# Patient Record
Sex: MALE | Race: BLACK OR AFRICAN AMERICAN | NOT HISPANIC OR LATINO | Employment: UNEMPLOYED | URBAN - METROPOLITAN AREA
[De-identification: names, ages, dates, MRNs, and addresses within clinical notes are randomized per-mention and may not be internally consistent; named-entity substitution may affect disease eponyms.]

---

## 2019-08-11 ENCOUNTER — HOSPITAL ENCOUNTER (INPATIENT)
Facility: HOSPITAL | Age: 59
LOS: 7 days | Discharge: HOME | End: 2019-08-18
Attending: EMERGENCY MEDICINE | Admitting: HOSPITALIST

## 2019-08-11 ENCOUNTER — APPOINTMENT (OUTPATIENT)
Dept: CT IMAGING | Facility: HOSPITAL | Age: 59
End: 2019-08-11

## 2019-08-11 ENCOUNTER — APPOINTMENT (OUTPATIENT)
Dept: GENERAL RADIOLOGY | Facility: HOSPITAL | Age: 59
End: 2019-08-11

## 2019-08-11 DIAGNOSIS — D72.829 LEUKOCYTOSIS, UNSPECIFIED TYPE: ICD-10-CM

## 2019-08-11 DIAGNOSIS — R73.9 HYPERGLYCEMIA: ICD-10-CM

## 2019-08-11 DIAGNOSIS — R07.9 CHEST PAIN, UNSPECIFIED TYPE: Primary | ICD-10-CM

## 2019-08-11 DIAGNOSIS — E87.1 HYPONATREMIA: ICD-10-CM

## 2019-08-11 PROBLEM — N39.0 UTI (URINARY TRACT INFECTION): Status: ACTIVE | Noted: 2019-08-11

## 2019-08-11 PROBLEM — S06.9XAA TBI (TRAUMATIC BRAIN INJURY) (HCC): Status: ACTIVE | Noted: 2019-08-11

## 2019-08-11 PROBLEM — I10 HYPERTENSION: Status: ACTIVE | Noted: 2019-08-11

## 2019-08-11 PROBLEM — K57.92 DIVERTICULITIS: Status: ACTIVE | Noted: 2019-08-11

## 2019-08-11 LAB
ALBUMIN SERPL-MCNC: 4.7 G/DL (ref 3.5–5.2)
ALBUMIN/GLOB SERPL: 1.3 G/DL
ALP SERPL-CCNC: 76 U/L (ref 39–117)
ALT SERPL W P-5'-P-CCNC: 6 U/L (ref 1–41)
AMPHET+METHAMPHET UR QL: NEGATIVE
ANION GAP SERPL CALCULATED.3IONS-SCNC: 13.1 MMOL/L (ref 5–15)
AST SERPL-CCNC: 12 U/L (ref 1–40)
BACTERIA UR QL AUTO: ABNORMAL /HPF
BARBITURATES UR QL SCN: NEGATIVE
BASOPHILS # BLD AUTO: 0.04 10*3/MM3 (ref 0–0.2)
BASOPHILS NFR BLD AUTO: 0.2 % (ref 0–1.5)
BENZODIAZ UR QL SCN: NEGATIVE
BILIRUB SERPL-MCNC: 0.5 MG/DL (ref 0.2–1.2)
BILIRUB UR QL STRIP: NEGATIVE
BUN BLD-MCNC: 8 MG/DL (ref 6–20)
BUN/CREAT SERPL: 15.4 (ref 7–25)
CALCIUM SPEC-SCNC: 9.5 MG/DL (ref 8.6–10.5)
CANNABINOIDS SERPL QL: NEGATIVE
CHLORIDE SERPL-SCNC: 95 MMOL/L (ref 98–107)
CLARITY UR: ABNORMAL
CO2 SERPL-SCNC: 23.9 MMOL/L (ref 22–29)
COCAINE UR QL: NEGATIVE
COLOR UR: ABNORMAL
CREAT BLD-MCNC: 0.52 MG/DL (ref 0.76–1.27)
D DIMER PPP FEU-MCNC: 0.63 MCGFEU/ML (ref 0–0.49)
DEPRECATED RDW RBC AUTO: 48.6 FL (ref 37–54)
EOSINOPHIL # BLD AUTO: 0.01 10*3/MM3 (ref 0–0.4)
EOSINOPHIL NFR BLD AUTO: 0.1 % (ref 0.3–6.2)
ERYTHROCYTE [DISTWIDTH] IN BLOOD BY AUTOMATED COUNT: 13.1 % (ref 12.3–15.4)
GFR SERPL CREATININE-BSD FRML MDRD: >150 ML/MIN/1.73
GLOBULIN UR ELPH-MCNC: 3.5 GM/DL
GLUCOSE BLD-MCNC: 166 MG/DL (ref 65–99)
GLUCOSE BLDC GLUCOMTR-MCNC: 155 MG/DL (ref 70–130)
GLUCOSE UR STRIP-MCNC: ABNORMAL MG/DL
HCT VFR BLD AUTO: 40.5 % (ref 37.5–51)
HGB BLD-MCNC: 14.2 G/DL (ref 13–17.7)
HGB UR QL STRIP.AUTO: ABNORMAL
HOLD SPECIMEN: NORMAL
HOLD SPECIMEN: NORMAL
HYALINE CASTS UR QL AUTO: ABNORMAL /LPF
IMM GRANULOCYTES # BLD AUTO: 0.1 10*3/MM3 (ref 0–0.05)
IMM GRANULOCYTES NFR BLD AUTO: 0.5 % (ref 0–0.5)
INR PPP: 1.1 (ref 0.9–1.1)
KETONES UR QL STRIP: ABNORMAL
LEUKOCYTE ESTERASE UR QL STRIP.AUTO: ABNORMAL
LIPASE SERPL-CCNC: 13 U/L (ref 13–60)
LYMPHOCYTES # BLD AUTO: 1.03 10*3/MM3 (ref 0.7–3.1)
LYMPHOCYTES NFR BLD AUTO: 5.5 % (ref 19.6–45.3)
MAGNESIUM SERPL-MCNC: 1.7 MG/DL (ref 1.6–2.6)
MCH RBC QN AUTO: 35.3 PG (ref 26.6–33)
MCHC RBC AUTO-ENTMCNC: 35.1 G/DL (ref 31.5–35.7)
MCV RBC AUTO: 100.7 FL (ref 79–97)
METHADONE UR QL SCN: NEGATIVE
MONOCYTES # BLD AUTO: 2.38 10*3/MM3 (ref 0.1–0.9)
MONOCYTES NFR BLD AUTO: 12.6 % (ref 5–12)
NEUTROPHILS # BLD AUTO: 15.26 10*3/MM3 (ref 1.7–7)
NEUTROPHILS NFR BLD AUTO: 81.1 % (ref 42.7–76)
NITRITE UR QL STRIP: NEGATIVE
NRBC BLD AUTO-RTO: 0 /100 WBC (ref 0–0.2)
OPIATES UR QL: POSITIVE
OXYCODONE UR QL SCN: NEGATIVE
PH UR STRIP.AUTO: 6 [PH] (ref 5–8)
PLATELET # BLD AUTO: 363 10*3/MM3 (ref 140–450)
PMV BLD AUTO: 10 FL (ref 6–12)
POTASSIUM BLD-SCNC: 3.9 MMOL/L (ref 3.5–5.2)
PROT SERPL-MCNC: 8.2 G/DL (ref 6–8.5)
PROT UR QL STRIP: ABNORMAL
PROTHROMBIN TIME: 13.9 SECONDS (ref 11.7–14.2)
RBC # BLD AUTO: 4.02 10*6/MM3 (ref 4.14–5.8)
RBC # UR: ABNORMAL /HPF
REF LAB TEST METHOD: ABNORMAL
SODIUM BLD-SCNC: 132 MMOL/L (ref 136–145)
SP GR UR STRIP: >=1.03 (ref 1–1.03)
SPERM URNS QL MICRO: ABNORMAL /HPF
SQUAMOUS #/AREA URNS HPF: ABNORMAL /HPF
TROPONIN T SERPL-MCNC: <0.01 NG/ML (ref 0–0.03)
UROBILINOGEN UR QL STRIP: ABNORMAL
WBC NRBC COR # BLD: 18.82 10*3/MM3 (ref 3.4–10.8)
WBC UR QL AUTO: ABNORMAL /HPF
WHOLE BLOOD HOLD SPECIMEN: NORMAL
WHOLE BLOOD HOLD SPECIMEN: NORMAL

## 2019-08-11 PROCEDURE — G0378 HOSPITAL OBSERVATION PER HR: HCPCS

## 2019-08-11 PROCEDURE — 80053 COMPREHEN METABOLIC PANEL: CPT | Performed by: EMERGENCY MEDICINE

## 2019-08-11 PROCEDURE — 81001 URINALYSIS AUTO W/SCOPE: CPT | Performed by: EMERGENCY MEDICINE

## 2019-08-11 PROCEDURE — 74177 CT ABD & PELVIS W/CONTRAST: CPT

## 2019-08-11 PROCEDURE — 80307 DRUG TEST PRSMV CHEM ANLYZR: CPT | Performed by: EMERGENCY MEDICINE

## 2019-08-11 PROCEDURE — 83735 ASSAY OF MAGNESIUM: CPT | Performed by: EMERGENCY MEDICINE

## 2019-08-11 PROCEDURE — 93005 ELECTROCARDIOGRAM TRACING: CPT | Performed by: EMERGENCY MEDICINE

## 2019-08-11 PROCEDURE — 71275 CT ANGIOGRAPHY CHEST: CPT

## 2019-08-11 PROCEDURE — 82962 GLUCOSE BLOOD TEST: CPT

## 2019-08-11 PROCEDURE — 51798 US URINE CAPACITY MEASURE: CPT

## 2019-08-11 PROCEDURE — 71046 X-RAY EXAM CHEST 2 VIEWS: CPT

## 2019-08-11 PROCEDURE — 25010000002 MORPHINE PER 10 MG: Performed by: INTERNAL MEDICINE

## 2019-08-11 PROCEDURE — 84484 ASSAY OF TROPONIN QUANT: CPT | Performed by: INTERNAL MEDICINE

## 2019-08-11 PROCEDURE — 25010000002 MORPHINE PER 10 MG: Performed by: EMERGENCY MEDICINE

## 2019-08-11 PROCEDURE — 85025 COMPLETE CBC W/AUTO DIFF WBC: CPT | Performed by: EMERGENCY MEDICINE

## 2019-08-11 PROCEDURE — 25010000002 CEFTRIAXONE PER 250 MG: Performed by: EMERGENCY MEDICINE

## 2019-08-11 PROCEDURE — 85610 PROTHROMBIN TIME: CPT | Performed by: EMERGENCY MEDICINE

## 2019-08-11 PROCEDURE — 93005 ELECTROCARDIOGRAM TRACING: CPT

## 2019-08-11 PROCEDURE — 84484 ASSAY OF TROPONIN QUANT: CPT | Performed by: EMERGENCY MEDICINE

## 2019-08-11 PROCEDURE — 0 IOPAMIDOL PER 1 ML: Performed by: EMERGENCY MEDICINE

## 2019-08-11 PROCEDURE — 93010 ELECTROCARDIOGRAM REPORT: CPT | Performed by: INTERNAL MEDICINE

## 2019-08-11 PROCEDURE — 99285 EMERGENCY DEPT VISIT HI MDM: CPT

## 2019-08-11 PROCEDURE — 85379 FIBRIN DEGRADATION QUANT: CPT | Performed by: EMERGENCY MEDICINE

## 2019-08-11 PROCEDURE — 87086 URINE CULTURE/COLONY COUNT: CPT | Performed by: EMERGENCY MEDICINE

## 2019-08-11 PROCEDURE — 83690 ASSAY OF LIPASE: CPT | Performed by: EMERGENCY MEDICINE

## 2019-08-11 RX ORDER — ALPRAZOLAM 0.5 MG/1
0.5 TABLET ORAL 2 TIMES DAILY PRN
COMMUNITY

## 2019-08-11 RX ORDER — SODIUM CHLORIDE 9 MG/ML
75 INJECTION, SOLUTION INTRAVENOUS CONTINUOUS
Status: DISCONTINUED | OUTPATIENT
Start: 2019-08-11 | End: 2019-08-13

## 2019-08-11 RX ORDER — GABAPENTIN 300 MG/1
300 CAPSULE ORAL 3 TIMES DAILY
COMMUNITY

## 2019-08-11 RX ORDER — SODIUM CHLORIDE 0.9 % (FLUSH) 0.9 %
3 SYRINGE (ML) INJECTION EVERY 12 HOURS SCHEDULED
Status: DISCONTINUED | OUTPATIENT
Start: 2019-08-11 | End: 2019-08-18 | Stop reason: HOSPADM

## 2019-08-11 RX ORDER — SODIUM CHLORIDE 0.9 % (FLUSH) 0.9 %
3-10 SYRINGE (ML) INJECTION AS NEEDED
Status: DISCONTINUED | OUTPATIENT
Start: 2019-08-11 | End: 2019-08-18 | Stop reason: HOSPADM

## 2019-08-11 RX ORDER — ALUMINA, MAGNESIA, AND SIMETHICONE 2400; 2400; 240 MG/30ML; MG/30ML; MG/30ML
15 SUSPENSION ORAL ONCE
Status: COMPLETED | OUTPATIENT
Start: 2019-08-11 | End: 2019-08-11

## 2019-08-11 RX ORDER — MORPHINE SULFATE 2 MG/ML
4 INJECTION, SOLUTION INTRAMUSCULAR; INTRAVENOUS ONCE
Status: COMPLETED | OUTPATIENT
Start: 2019-08-11 | End: 2019-08-11

## 2019-08-11 RX ORDER — CETIRIZINE HYDROCHLORIDE 10 MG/1
10 TABLET ORAL DAILY PRN
COMMUNITY

## 2019-08-11 RX ORDER — CEFTRIAXONE SODIUM 1 G/50ML
1 INJECTION, SOLUTION INTRAVENOUS ONCE
Status: COMPLETED | OUTPATIENT
Start: 2019-08-11 | End: 2019-08-11

## 2019-08-11 RX ORDER — SODIUM CHLORIDE 0.9 % (FLUSH) 0.9 %
10 SYRINGE (ML) INJECTION AS NEEDED
Status: DISCONTINUED | OUTPATIENT
Start: 2019-08-11 | End: 2019-08-18 | Stop reason: HOSPADM

## 2019-08-11 RX ORDER — ACETAMINOPHEN 325 MG/1
650 TABLET ORAL EVERY 4 HOURS PRN
Status: DISCONTINUED | OUTPATIENT
Start: 2019-08-11 | End: 2019-08-18 | Stop reason: HOSPADM

## 2019-08-11 RX ORDER — LIDOCAINE HYDROCHLORIDE 20 MG/ML
15 SOLUTION OROPHARYNGEAL ONCE
Status: COMPLETED | OUTPATIENT
Start: 2019-08-11 | End: 2019-08-11

## 2019-08-11 RX ORDER — ONDANSETRON 2 MG/ML
4 INJECTION INTRAMUSCULAR; INTRAVENOUS EVERY 6 HOURS PRN
Status: DISCONTINUED | OUTPATIENT
Start: 2019-08-11 | End: 2019-08-18 | Stop reason: HOSPADM

## 2019-08-11 RX ORDER — CEFTRIAXONE SODIUM 1 G/50ML
1 INJECTION, SOLUTION INTRAVENOUS EVERY 24 HOURS
Status: DISCONTINUED | OUTPATIENT
Start: 2019-08-12 | End: 2019-08-18 | Stop reason: HOSPADM

## 2019-08-11 RX ORDER — MORPHINE SULFATE 2 MG/ML
4 INJECTION, SOLUTION INTRAMUSCULAR; INTRAVENOUS EVERY 4 HOURS PRN
Status: DISCONTINUED | OUTPATIENT
Start: 2019-08-11 | End: 2019-08-15

## 2019-08-11 RX ORDER — TOPIRAMATE 50 MG/1
50 TABLET, FILM COATED ORAL DAILY
COMMUNITY

## 2019-08-11 RX ORDER — SERTRALINE HYDROCHLORIDE 100 MG/1
100 TABLET, FILM COATED ORAL DAILY
COMMUNITY

## 2019-08-11 RX ORDER — NITROGLYCERIN 0.4 MG/1
0.4 TABLET SUBLINGUAL
Status: DISCONTINUED | OUTPATIENT
Start: 2019-08-11 | End: 2019-08-18 | Stop reason: HOSPADM

## 2019-08-11 RX ADMIN — LIDOCAINE HYDROCHLORIDE 15 ML: 20 SOLUTION ORAL; TOPICAL at 21:20

## 2019-08-11 RX ADMIN — MORPHINE SULFATE 4 MG: 2 INJECTION, SOLUTION INTRAMUSCULAR; INTRAVENOUS at 23:28

## 2019-08-11 RX ADMIN — CEFTRIAXONE SODIUM 1 G: 1 INJECTION, SOLUTION INTRAVENOUS at 21:21

## 2019-08-11 RX ADMIN — SODIUM CHLORIDE 125 ML/HR: 9 INJECTION, SOLUTION INTRAVENOUS at 23:29

## 2019-08-11 RX ADMIN — MORPHINE SULFATE 4 MG: 2 INJECTION, SOLUTION INTRAMUSCULAR; INTRAVENOUS at 17:51

## 2019-08-11 RX ADMIN — SODIUM CHLORIDE 500 ML: 9 INJECTION, SOLUTION INTRAVENOUS at 18:21

## 2019-08-11 RX ADMIN — ALUMINUM HYDROXIDE, MAGNESIUM HYDROXIDE, AND DIMETHICONE 15 ML: 400; 400; 40 SUSPENSION ORAL at 21:20

## 2019-08-11 RX ADMIN — IOPAMIDOL 95 ML: 755 INJECTION, SOLUTION INTRAVENOUS at 18:52

## 2019-08-11 NOTE — ED PROVIDER NOTES
EMERGENCY DEPARTMENT ENCOUNTER    CHIEF COMPLAINT  Chief Complaint: CP  History given by: pt, EMS  History limited by: nothing  Room Number: 13/13  PMD: PerazaHeather APRN      HPI:  Pt is a 59 y.o. male who presents via EMS complaining of CP that started at 0900 this morning while he was sitting down. His pain is worsened with breathing. He also c/o diffuse abd pain (which started 2 days ago) and chronic back pain but denies SOA, BLE edema, N/V, fever, cough, problems with urination or BM and all other complaints at this time. Pt is not anticoagulated. Pt has been seen at [facility] in January 2019 after a similar episode of chest pain. He notes a hx of DM (oral medicine, sugars have been running 130s) and HTN but denies dialysis. Pt quit smoking one year ago. He denies recent travel. Pt was given Nitro and aspirin en route via EMS.     Duration:  7 hours  Onset: gradual  Timing: constant  Location: chest  Radiation: none  Quality: pain  Intensity/Severity: moderate  Progression: unchanged  Associated Symptoms: diffuse abd pain, chronic back pain  Aggravating Factors: breathing  Alleviating Factors: none  Previous Episodes: similar episode in January 2019  Treatment before arrival: Pt was given Nitro and aspirin en route via EMS.    PAST MEDICAL HISTORY  Active Ambulatory Problems     Diagnosis Date Noted   • No Active Ambulatory Problems     Resolved Ambulatory Problems     Diagnosis Date Noted   • No Resolved Ambulatory Problems     Past Medical History:   Diagnosis Date   • Concussion    • Diverticulitis    • Hyperlipidemia    • Hypertension    • Kidney stone    • Migraine    • TBI (traumatic brain injury) (CMS/HCC)    • Torn meniscus        PAST SURGICAL HISTORY  Past Surgical History:   Procedure Laterality Date   • CARDIAC CATHETERIZATION     • COLON RESECTION         FAMILY HISTORY  History reviewed. No pertinent family history.    SOCIAL HISTORY  Social History     Socioeconomic History   • Marital  status: Unknown     Spouse name: Not on file   • Number of children: Not on file   • Years of education: Not on file   • Highest education level: Not on file       ALLERGIES  Patient has no known allergies.    REVIEW OF SYSTEMS  Review of Systems   Constitutional: Negative for fever.   HENT: Negative for sore throat.    Eyes: Negative for visual disturbance.   Respiratory: Negative for cough and shortness of breath.    Cardiovascular: Positive for chest pain. Negative for leg swelling.   Gastrointestinal: Positive for abdominal pain. Negative for diarrhea, nausea and vomiting.   Genitourinary: Negative for difficulty urinating.   Musculoskeletal: Positive for back pain.   Skin: Negative for rash and wound.   Neurological: Negative for syncope.   Psychiatric/Behavioral: Negative for confusion.       PHYSICAL EXAM  ED Triage Vitals   Temp Pulse Resp BP SpO2   -- -- -- -- --      Temp src Heart Rate Source Patient Position BP Location FiO2 (%)   -- -- -- -- --       Physical Exam   Constitutional: He is oriented to person, place, and time. He appears distressed.   HENT:   Head: Normocephalic and atraumatic.   Eyes: EOM are normal.   Neck: Normal range of motion.   Cardiovascular: Normal rate, regular rhythm and normal heart sounds.   No murmur heard.  Pulses:       Posterior tibial pulses are 2+ on the right side, and 2+ on the left side.   Posterior tibial pulses intact.    Pulmonary/Chest: Effort normal and breath sounds normal. No respiratory distress. He has no wheezes.   Abdominal: Soft. Bowel sounds are normal. There is no tenderness. There is no rebound and no guarding.   Musculoskeletal: Normal range of motion. He exhibits edema.   Trace edema to BLE.   Neurological: He is alert and oriented to person, place, and time.   Skin: Skin is warm and dry.   Psychiatric: Affect normal.   Nursing note and vitals reviewed.      LAB RESULTS  Lab Results (last 24 hours)     Procedure Component Value Units Date/Time    CBC  & Differential [[id number]] Collected:  08/11/19 1730    Specimen:  Blood Updated:  08/11/19 1801    Narrative:       The following orders were created for panel order CBC & Differential.  Procedure                               Abnormality         Status                     ---------                               -----------         ------                     CBC Auto Differential[[id number]]        Abnormal            Final result                 Please view results for these tests on the individual orders.    Comprehensive Metabolic Panel [[id number]]  (Abnormal) Collected:  08/11/19 1730    Specimen:  Blood Updated:  08/11/19 1809     Glucose 166 mg/dL      BUN 8 mg/dL      Creatinine 0.52 mg/dL      Sodium 132 mmol/L      Potassium 3.9 mmol/L      Chloride 95 mmol/L      CO2 23.9 mmol/L      Calcium 9.5 mg/dL      Total Protein 8.2 g/dL      Albumin 4.70 g/dL      ALT (SGPT) 6 U/L      AST (SGOT) 12 U/L      Alkaline Phosphatase 76 U/L      Total Bilirubin 0.5 mg/dL      eGFR  African Amer >150 mL/min/1.73      Globulin 3.5 gm/dL      A/G Ratio 1.3 g/dL      BUN/Creatinine Ratio 15.4     Anion Gap 13.1 mmol/L     Narrative:       GFR Normal >60  Chronic Kidney Disease <60  Kidney Failure <15    Protime-INR [[id number]]  (Normal) Collected:  08/11/19 1730    Specimen:  Blood Updated:  08/11/19 1757     Protime 13.9 Seconds      INR 1.10    Magnesium [[id number]]  (Normal) Collected:  08/11/19 1730    Specimen:  Blood Updated:  08/11/19 1809     Magnesium 1.7 mg/dL     Lipase [[id number]]  (Normal) Collected:  08/11/19 1730    Specimen:  Blood Updated:  08/11/19 1809     Lipase 13 U/L     D-dimer, Quantitative [[id number]]  (Abnormal) Collected:  08/11/19 1730    Specimen:  Blood Updated:  08/11/19 1757     D-Dimer, Quantitative 0.63 MCGFEU/mL     Narrative:       The Stago D-Dimer test used in conjunction with a clinical pretest probability (PTP) assessment model, has been approved by the FDA to rule out the presence  of venous thromboembolism (VTE) in outpatients suspected of deep venous thrombosis (DVT) or pulmonary embolism (PE). The cut-off for negative predictive value is <0.50 MCGFEU/mL.    Troponin [[id number]]  (Normal) Collected:  08/11/19 1730    Specimen:  Blood Updated:  08/11/19 1809     Troponin T <0.010 ng/mL     Narrative:       Troponin T Reference Range:  <= 0.03 ng/mL-   Negative for AMI  >0.03 ng/mL-     Abnormal for myocardial necrosis.  Clinicians would have to utilize clinical acumen, EKG, Troponin and serial changes to determine if it is an Acute Myocardial Infarction or myocardial injury due to an underlying chronic condition.     CBC Auto Differential [[id number]]  (Abnormal) Collected:  08/11/19 1730    Specimen:  Blood Updated:  08/11/19 1801     WBC 18.82 10*3/mm3      RBC 4.02 10*6/mm3      Hemoglobin 14.2 g/dL      Hematocrit 40.5 %      .7 fL      MCH 35.3 pg      MCHC 35.1 g/dL      RDW 13.1 %      RDW-SD 48.6 fl      MPV 10.0 fL      Platelets 363 10*3/mm3      Neutrophil % 81.1 %      Lymphocyte % 5.5 %      Monocyte % 12.6 %      Eosinophil % 0.1 %      Basophil % 0.2 %      Immature Grans % 0.5 %      Neutrophils, Absolute 15.26 10*3/mm3      Lymphocytes, Absolute 1.03 10*3/mm3      Monocytes, Absolute 2.38 10*3/mm3      Eosinophils, Absolute 0.01 10*3/mm3      Basophils, Absolute 0.04 10*3/mm3      Immature Grans, Absolute 0.10 10*3/mm3      nRBC 0.0 /100 WBC     POC Glucose Once [[id number]]  (Abnormal) Collected:  08/11/19 1731    Specimen:  Blood Updated:  08/11/19 1734     Glucose 155 mg/dL     Urinalysis With Microscopic If Indicated (No Culture) - Urine, Clean Catch [[id number]]  (Abnormal) Collected:  08/11/19 1803    Specimen:  Urine, Clean Catch Updated:  08/11/19 1835     Color, UA Dark Yellow     Appearance, UA Cloudy     pH, UA 6.0     Specific Gravity, UA >=1.030     Glucose, UA >=1000 mg/dL (3+)     Ketones, UA 15 mg/dL (1+)     Bilirubin, UA Negative     Blood, UA Moderate  (2+)     Protein, UA Trace     Leuk Esterase, UA Moderate (2+)     Nitrite, UA Negative     Urobilinogen, UA 2.0 E.U./dL    Urine Drug Screen - Urine, Clean Catch [[id number]]  (Abnormal) Collected:  08/11/19 1803    Specimen:  Urine, Clean Catch Updated:  08/11/19 1838     Amphet/Methamphet, Screen Negative     Barbiturates Screen, Urine Negative     Benzodiazepine Screen, Urine Negative     Cocaine Screen, Urine Negative     Opiate Screen Positive     THC, Screen, Urine Negative     Methadone Screen, Urine Negative     Oxycodone Screen, Urine Negative    Narrative:       Negative Thresholds For Drugs Screened:     Amphetamines               500 ng/ml   Barbiturates               200 ng/ml   Benzodiazepines            100 ng/ml   Cocaine                    300 ng/ml   Methadone                  300 ng/ml   Opiates                    300 ng/ml   Oxycodone                  100 ng/ml   THC                        50 ng/ml    The Normal Value for all drugs tested is negative. This report includes final unconfirmed screening results to be used for medical treatment purposes only. Unconfirmed results must not be used for non-medical purposes such as employment or legal testing. Clinical consideration should be applied to any drug of abuse test, particulary when unconfirmed results are used.    Urinalysis, Microscopic Only - Urine, Clean Catch [[id number]]  (Abnormal) Collected:  08/11/19 1803    Specimen:  Urine, Clean Catch Updated:  08/11/19 1852     RBC, UA 21-30 /HPF      WBC, UA 21-30 /HPF      Bacteria, UA None Seen /HPF      Squamous Epithelial Cells, UA 3-6 /HPF      Hyaline Casts, UA 13-20 /LPF      Sperm, UA Moderate/2+ /HPF      Methodology Manual Light Microscopy    Urine Culture - Urine, Urine, Clean Catch [[id number]] Collected:  08/11/19 1803    Specimen:  Urine, Clean Catch Updated:  08/11/19 2029          I ordered the above labs and reviewed the results    RADIOLOGY  CT Angiogram Chest With Contrast   Final  Result   1. Dependent lower lobe opacities are favored to be related to areas of   atelectasis, no active airspace disease.   2. Suboptimal IV contrast bolus but no central or large proximal PE   demonstrated.                           CT SCANS ABDOMEN AND PELVIS WITH IV CONTRAST.       HISTORY: eval for PE       COMPARISON: None.       TECHNIQUE: Radiation dose reduction techniques were utilized, including   automated exposure control and exposure modulation based on body size.   Axial images were obtained from the lung bases to the symphysis pubis   with IV contrast only.. Oral contrast was not administered per request.       FINDINGS :  There are a couple of subcentimeter renal lesions   bilaterally. The lesions are too small for accurate or detailed   assessment. Small cysts are favored, particularly if there is no history   of malignancy.  Suggest follow-up to ensure stability. Remaining solid   organs have an unremarkable appearance. Normal aorta and appendix. Mild   diverticulosis. Mild constipation. The GI tract not opacified for   assessment but non obstructive in appearance. There is a fat-containing   ventral hernia to the left of midline without associated inflammatory   change.               IMPRESSION :    1. Tiny renal lesions as discussed.   2. Constipation, diverticulosis, no obstruction or acute inflammation           This report was finalized on 8/11/2019 7:22 PM by Christian Hutson M.D.          CT Abdomen Pelvis With Contrast   Final Result   1. Dependent lower lobe opacities are favored to be related to areas of   atelectasis, no active airspace disease.   2. Suboptimal IV contrast bolus but no central or large proximal PE   demonstrated.                           CT SCANS ABDOMEN AND PELVIS WITH IV CONTRAST.       HISTORY: eval for PE       COMPARISON: None.       TECHNIQUE: Radiation dose reduction techniques were utilized, including   automated exposure control and exposure modulation based on  body size.   Axial images were obtained from the lung bases to the symphysis pubis   with IV contrast only.. Oral contrast was not administered per request.       FINDINGS :  There are a couple of subcentimeter renal lesions   bilaterally. The lesions are too small for accurate or detailed   assessment. Small cysts are favored, particularly if there is no history   of malignancy.  Suggest follow-up to ensure stability. Remaining solid   organs have an unremarkable appearance. Normal aorta and appendix. Mild   diverticulosis. Mild constipation. The GI tract not opacified for   assessment but non obstructive in appearance. There is a fat-containing   ventral hernia to the left of midline without associated inflammatory   change.               IMPRESSION :    1. Tiny renal lesions as discussed.   2. Constipation, diverticulosis, no obstruction or acute inflammation           This report was finalized on 8/11/2019 7:22 PM by Christian Hutson M.D.          XR Chest 2 View   Final Result         Reviewed CXR.   Tiny bilateral pleural effusions. Otherwise unremarkable chest x-ray.    Independently viewed by me. Interpreted by radiologist.    I ordered the above noted radiological studies. Interpreted by radiologist. Reviewed by me in PACS.       PROCEDURES  Procedures    EKG          EKG time: 1723  Rhythm/Rate: sinus tachycardia rate 100s  P waves and UT: nml  Normal Rakan  Poor R wave prgression  ST and T waves: ST elevation in inferior leads without reciprocal changes      Interpreted Contemporaneously by me, independently viewed  No prior for comparison.      PROGRESS AND CONSULTS      1725. Discussed case with Dr Ramirez, [facility], who would not like Team C called.    1732. Ordered CXR, labwork, bladder scan and cardiac monitoring for workup.     1743. Ordered morphine for pain.     1805. Ordered CT abd/pelvis and CTA chest.     2020. Ordered UA.     2021. Placed call to A.     2024. BP- 141/93 HR- 101 Temp- 99.1 °F (37.3 °C)  (Tympanic) O2 sat- 95%  Rechecked the patient who is states he feels better but states he is still experiencing CP. Informed pt of labwork showing no MI but elevated WBC indicating infection. Discussed plan to admit the patient. Pt understands and agrees with the plan, all questions answered.    2035. Discussed case with Dr Grossman DAVE, who agrees to admit the patient.          MEDICAL DECISION MAKING  Results were reviewed/discussed with the patient and they were also made aware of online access. Pt also made aware that some labs, such as cultures, will not be resulted during ER visit and follow up with PMD is necessary.     MDM  Number of Diagnoses or Management Options  Chest pain, unspecified type:   Hyperglycemia:   Hyponatremia:   Leukocytosis, unspecified type:      Amount and/or Complexity of Data Reviewed  Clinical lab tests: ordered and reviewed (Positive opiate UDS)  Tests in the radiology section of CPT®: ordered and reviewed (Bilateral pleural effusions seen on CXR)  Tests in the medicine section of CPT®: reviewed and ordered (See EKG procedure note.)  Decide to obtain previous medical records or to obtain history from someone other than the patient: yes ([facility], medical records)  Review and summarize past medical records: yes (Saw Richard Mehta in follow-up for an [facility] ER visit in May 2014. Tyson reports cardiac cath at [facility] in September 2013 which showed mild coronary artery disease and a normal EF. Sees Dr Reyes for intractable headaches and pericranial nerve blocks. Saw Dr Boyle June 12 kidney stone. )  Discuss the patient with other providers: yes (Dr Ramirez, [facility] [place]; Dr Grossman, [facility])    Patient Progress  Patient progress: stable         DIAGNOSIS  Final diagnoses:   Chest pain, unspecified type   Hyperglycemia   Hyponatremia   Leukocytosis, unspecified type       DISPOSITION  ADMISSION    Discussed treatment plan and reason for admission with pt/family and admitting physician.  Pt/family  voiced understanding of the plan for admission for further testing/treatment as needed.       Latest Documented Vital Signs:  As of 8:35 PM  BP- 141/93 HR- 101 Temp- 99.1 °F (37.3 °C) (Tympanic) O2 sat- 95%    --  Documentation assistance provided by sunni Hull for Dr Melissa MD.  Information recorded by the scribe was done at my direction and has been verified and validated by me.       Chayo Hull  08/11/19 [id number]       Niki Torres MD  08/14/19 [id number]

## 2019-08-12 ENCOUNTER — APPOINTMENT (OUTPATIENT)
Dept: CARDIOLOGY | Facility: HOSPITAL | Age: 59
End: 2019-08-12

## 2019-08-12 PROBLEM — E78.5 HYPERLIPIDEMIA: Chronic | Status: ACTIVE | Noted: 2019-08-12

## 2019-08-12 PROBLEM — R71.8 ELEVATED MCV: Status: ACTIVE | Noted: 2019-08-12

## 2019-08-12 PROBLEM — N48.1 BALANITIS: Status: ACTIVE | Noted: 2019-08-12

## 2019-08-12 PROBLEM — E11.9 DM2 (DIABETES MELLITUS, TYPE 2) (HCC): Chronic | Status: ACTIVE | Noted: 2019-08-12

## 2019-08-12 PROBLEM — I25.10 CORONARY ARTERY DISEASE: Chronic | Status: ACTIVE | Noted: 2019-08-12

## 2019-08-12 PROBLEM — R79.89 ELEVATED D-DIMER: Status: ACTIVE | Noted: 2019-08-12

## 2019-08-12 PROBLEM — E87.6 HYPOKALEMIA: Status: ACTIVE | Noted: 2019-08-12

## 2019-08-12 LAB
ALBUMIN SERPL-MCNC: 3.8 G/DL (ref 3.5–5.2)
ALBUMIN/GLOB SERPL: 1.2 G/DL
ALP SERPL-CCNC: 61 U/L (ref 39–117)
ALT SERPL W P-5'-P-CCNC: 6 U/L (ref 1–41)
ANION GAP SERPL CALCULATED.3IONS-SCNC: 14.3 MMOL/L (ref 5–15)
AST SERPL-CCNC: 10 U/L (ref 1–40)
BACTERIA SPEC AEROBE CULT: NORMAL
BASOPHILS # BLD AUTO: 0.04 10*3/MM3 (ref 0–0.2)
BASOPHILS NFR BLD AUTO: 0.3 % (ref 0–1.5)
BH CV LOWER VASCULAR LEFT COMMON FEMORAL AUGMENT: NORMAL
BH CV LOWER VASCULAR LEFT COMMON FEMORAL COMPETENT: NORMAL
BH CV LOWER VASCULAR LEFT COMMON FEMORAL COMPRESS: NORMAL
BH CV LOWER VASCULAR LEFT COMMON FEMORAL PHASIC: NORMAL
BH CV LOWER VASCULAR LEFT COMMON FEMORAL SPONT: NORMAL
BH CV LOWER VASCULAR LEFT DISTAL FEMORAL COMPRESS: NORMAL
BH CV LOWER VASCULAR LEFT GREATER SAPH AK COMPRESS: NORMAL
BH CV LOWER VASCULAR LEFT GREATER SAPH BK COMPRESS: NORMAL
BH CV LOWER VASCULAR LEFT MID FEMORAL AUGMENT: NORMAL
BH CV LOWER VASCULAR LEFT MID FEMORAL COMPETENT: NORMAL
BH CV LOWER VASCULAR LEFT MID FEMORAL COMPRESS: NORMAL
BH CV LOWER VASCULAR LEFT MID FEMORAL PHASIC: NORMAL
BH CV LOWER VASCULAR LEFT MID FEMORAL SPONT: NORMAL
BH CV LOWER VASCULAR LEFT PERONEAL COMPRESS: NORMAL
BH CV LOWER VASCULAR LEFT POPLITEAL AUGMENT: NORMAL
BH CV LOWER VASCULAR LEFT POPLITEAL COMPETENT: NORMAL
BH CV LOWER VASCULAR LEFT POPLITEAL COMPRESS: NORMAL
BH CV LOWER VASCULAR LEFT POPLITEAL PHASIC: NORMAL
BH CV LOWER VASCULAR LEFT POPLITEAL SPONT: NORMAL
BH CV LOWER VASCULAR LEFT POSTERIOR TIBIAL COMPRESS: NORMAL
BH CV LOWER VASCULAR LEFT PROXIMAL FEMORAL COMPRESS: NORMAL
BH CV LOWER VASCULAR LEFT SAPHENOFEMORAL JUNCTION COMPRESS: NORMAL
BH CV LOWER VASCULAR RIGHT COMMON FEMORAL AUGMENT: NORMAL
BH CV LOWER VASCULAR RIGHT COMMON FEMORAL COMPETENT: NORMAL
BH CV LOWER VASCULAR RIGHT COMMON FEMORAL COMPRESS: NORMAL
BH CV LOWER VASCULAR RIGHT COMMON FEMORAL PHASIC: NORMAL
BH CV LOWER VASCULAR RIGHT COMMON FEMORAL SPONT: NORMAL
BH CV LOWER VASCULAR RIGHT DISTAL FEMORAL COMPRESS: NORMAL
BH CV LOWER VASCULAR RIGHT GASTRONEMIUS COMPRESS: NORMAL
BH CV LOWER VASCULAR RIGHT GREATER SAPH AK COMPRESS: NORMAL
BH CV LOWER VASCULAR RIGHT GREATER SAPH BK COMPRESS: NORMAL
BH CV LOWER VASCULAR RIGHT MID FEMORAL AUGMENT: NORMAL
BH CV LOWER VASCULAR RIGHT MID FEMORAL COMPETENT: NORMAL
BH CV LOWER VASCULAR RIGHT MID FEMORAL COMPRESS: NORMAL
BH CV LOWER VASCULAR RIGHT MID FEMORAL PHASIC: NORMAL
BH CV LOWER VASCULAR RIGHT MID FEMORAL SPONT: NORMAL
BH CV LOWER VASCULAR RIGHT PERONEAL COMPRESS: NORMAL
BH CV LOWER VASCULAR RIGHT POPLITEAL AUGMENT: NORMAL
BH CV LOWER VASCULAR RIGHT POPLITEAL COMPETENT: NORMAL
BH CV LOWER VASCULAR RIGHT POPLITEAL COMPRESS: NORMAL
BH CV LOWER VASCULAR RIGHT POPLITEAL PHASIC: NORMAL
BH CV LOWER VASCULAR RIGHT POPLITEAL SPONT: NORMAL
BH CV LOWER VASCULAR RIGHT POSTERIOR TIBIAL COMPRESS: NORMAL
BH CV LOWER VASCULAR RIGHT PROXIMAL FEMORAL COMPRESS: NORMAL
BH CV LOWER VASCULAR RIGHT SAPHENOFEMORAL JUNCTION COMPRESS: NORMAL
BILIRUB SERPL-MCNC: 0.5 MG/DL (ref 0.2–1.2)
BUN BLD-MCNC: 9 MG/DL (ref 6–20)
BUN/CREAT SERPL: 16.1 (ref 7–25)
CALCIUM SPEC-SCNC: 8.6 MG/DL (ref 8.6–10.5)
CHLORIDE SERPL-SCNC: 99 MMOL/L (ref 98–107)
CO2 SERPL-SCNC: 23.7 MMOL/L (ref 22–29)
CREAT BLD-MCNC: 0.56 MG/DL (ref 0.76–1.27)
CRP SERPL-MCNC: 19.86 MG/DL (ref 0–0.5)
DEPRECATED RDW RBC AUTO: 49.6 FL (ref 37–54)
EOSINOPHIL # BLD AUTO: 0.09 10*3/MM3 (ref 0–0.4)
EOSINOPHIL NFR BLD AUTO: 0.6 % (ref 0.3–6.2)
ERYTHROCYTE [DISTWIDTH] IN BLOOD BY AUTOMATED COUNT: 13.2 % (ref 12.3–15.4)
ERYTHROCYTE [SEDIMENTATION RATE] IN BLOOD: 56 MM/HR (ref 0–20)
GFR SERPL CREATININE-BSD FRML MDRD: >150 ML/MIN/1.73
GLOBULIN UR ELPH-MCNC: 3.1 GM/DL
GLUCOSE BLD-MCNC: 165 MG/DL (ref 65–99)
GLUCOSE BLDC GLUCOMTR-MCNC: 113 MG/DL (ref 70–130)
GLUCOSE BLDC GLUCOMTR-MCNC: 130 MG/DL (ref 70–130)
GLUCOSE BLDC GLUCOMTR-MCNC: 131 MG/DL (ref 70–130)
HBA1C MFR BLD: 6 % (ref 4.8–5.6)
HCT VFR BLD AUTO: 38.1 % (ref 37.5–51)
HGB BLD-MCNC: 12.8 G/DL (ref 13–17.7)
IMM GRANULOCYTES # BLD AUTO: 0.08 10*3/MM3 (ref 0–0.05)
IMM GRANULOCYTES NFR BLD AUTO: 0.5 % (ref 0–0.5)
LYMPHOCYTES # BLD AUTO: 1.55 10*3/MM3 (ref 0.7–3.1)
LYMPHOCYTES NFR BLD AUTO: 10.3 % (ref 19.6–45.3)
MAGNESIUM SERPL-MCNC: 1.7 MG/DL (ref 1.6–2.6)
MCH RBC QN AUTO: 34 PG (ref 26.6–33)
MCHC RBC AUTO-ENTMCNC: 33.6 G/DL (ref 31.5–35.7)
MCV RBC AUTO: 101.3 FL (ref 79–97)
MONOCYTES # BLD AUTO: 2 10*3/MM3 (ref 0.1–0.9)
MONOCYTES NFR BLD AUTO: 13.3 % (ref 5–12)
NEUTROPHILS # BLD AUTO: 11.33 10*3/MM3 (ref 1.7–7)
NEUTROPHILS NFR BLD AUTO: 75 % (ref 42.7–76)
NRBC BLD AUTO-RTO: 0 /100 WBC (ref 0–0.2)
PLATELET # BLD AUTO: 308 10*3/MM3 (ref 140–450)
PMV BLD AUTO: 9.6 FL (ref 6–12)
POTASSIUM BLD-SCNC: 3.3 MMOL/L (ref 3.5–5.2)
PROT SERPL-MCNC: 6.9 G/DL (ref 6–8.5)
RBC # BLD AUTO: 3.76 10*6/MM3 (ref 4.14–5.8)
SODIUM BLD-SCNC: 137 MMOL/L (ref 136–145)
TROPONIN T SERPL-MCNC: <0.01 NG/ML (ref 0–0.03)
WBC NRBC COR # BLD: 15.09 10*3/MM3 (ref 3.4–10.8)

## 2019-08-12 PROCEDURE — 85652 RBC SED RATE AUTOMATED: CPT | Performed by: NURSE PRACTITIONER

## 2019-08-12 PROCEDURE — 93970 EXTREMITY STUDY: CPT

## 2019-08-12 PROCEDURE — 82962 GLUCOSE BLOOD TEST: CPT

## 2019-08-12 PROCEDURE — 83036 HEMOGLOBIN GLYCOSYLATED A1C: CPT | Performed by: INTERNAL MEDICINE

## 2019-08-12 PROCEDURE — 85025 COMPLETE CBC W/AUTO DIFF WBC: CPT | Performed by: INTERNAL MEDICINE

## 2019-08-12 PROCEDURE — 94664 DEMO&/EVAL PT USE INHALER: CPT

## 2019-08-12 PROCEDURE — 86140 C-REACTIVE PROTEIN: CPT | Performed by: NURSE PRACTITIONER

## 2019-08-12 PROCEDURE — 83735 ASSAY OF MAGNESIUM: CPT | Performed by: NURSE PRACTITIONER

## 2019-08-12 PROCEDURE — 94799 UNLISTED PULMONARY SVC/PX: CPT

## 2019-08-12 PROCEDURE — 84484 ASSAY OF TROPONIN QUANT: CPT | Performed by: INTERNAL MEDICINE

## 2019-08-12 PROCEDURE — 99222 1ST HOSP IP/OBS MODERATE 55: CPT | Performed by: INTERNAL MEDICINE

## 2019-08-12 PROCEDURE — 80053 COMPREHEN METABOLIC PANEL: CPT | Performed by: INTERNAL MEDICINE

## 2019-08-12 PROCEDURE — 25010000002 CEFTRIAXONE PER 250 MG: Performed by: INTERNAL MEDICINE

## 2019-08-12 PROCEDURE — 94640 AIRWAY INHALATION TREATMENT: CPT

## 2019-08-12 PROCEDURE — 25010000002 MORPHINE PER 10 MG: Performed by: INTERNAL MEDICINE

## 2019-08-12 RX ORDER — POTASSIUM CHLORIDE 750 MG/1
40 CAPSULE, EXTENDED RELEASE ORAL AS NEEDED
Status: DISCONTINUED | OUTPATIENT
Start: 2019-08-12 | End: 2019-08-18 | Stop reason: HOSPADM

## 2019-08-12 RX ORDER — ESOMEPRAZOLE MAGNESIUM 40 MG/1
40 CAPSULE, DELAYED RELEASE ORAL
COMMUNITY

## 2019-08-12 RX ORDER — DEXTROSE MONOHYDRATE 25 G/50ML
25 INJECTION, SOLUTION INTRAVENOUS
Status: DISCONTINUED | OUTPATIENT
Start: 2019-08-12 | End: 2019-08-18 | Stop reason: HOSPADM

## 2019-08-12 RX ORDER — ASPIRIN 81 MG/1
81 TABLET ORAL DAILY
Status: DISCONTINUED | OUTPATIENT
Start: 2019-08-12 | End: 2019-08-18 | Stop reason: HOSPADM

## 2019-08-12 RX ORDER — POTASSIUM CHLORIDE 750 MG/1
40 CAPSULE, EXTENDED RELEASE ORAL ONCE
Status: COMPLETED | OUTPATIENT
Start: 2019-08-12 | End: 2019-08-12

## 2019-08-12 RX ORDER — NICOTINE POLACRILEX 4 MG
15 LOZENGE BUCCAL
Status: DISCONTINUED | OUTPATIENT
Start: 2019-08-12 | End: 2019-08-18 | Stop reason: HOSPADM

## 2019-08-12 RX ORDER — AMLODIPINE BESYLATE AND BENAZEPRIL HYDROCHLORIDE 10; 20 MG/1; MG/1
1 CAPSULE ORAL DAILY
COMMUNITY
End: 2019-08-18 | Stop reason: HOSPADM

## 2019-08-12 RX ORDER — POTASSIUM CHLORIDE 1.5 G/1.77G
40 POWDER, FOR SOLUTION ORAL AS NEEDED
Status: DISCONTINUED | OUTPATIENT
Start: 2019-08-12 | End: 2019-08-18 | Stop reason: HOSPADM

## 2019-08-12 RX ORDER — CLOTRIMAZOLE AND BETAMETHASONE DIPROPIONATE 10; .64 MG/G; MG/G
CREAM TOPICAL EVERY 12 HOURS SCHEDULED
Status: DISCONTINUED | OUTPATIENT
Start: 2019-08-12 | End: 2019-08-18 | Stop reason: HOSPADM

## 2019-08-12 RX ORDER — BUDESONIDE AND FORMOTEROL FUMARATE DIHYDRATE 80; 4.5 UG/1; UG/1
2 AEROSOL RESPIRATORY (INHALATION)
Status: DISCONTINUED | OUTPATIENT
Start: 2019-08-12 | End: 2019-08-18 | Stop reason: HOSPADM

## 2019-08-12 RX ORDER — BUDESONIDE AND FORMOTEROL FUMARATE DIHYDRATE 80; 4.5 UG/1; UG/1
2 AEROSOL RESPIRATORY (INHALATION)
COMMUNITY
End: 2019-08-18 | Stop reason: HOSPADM

## 2019-08-12 RX ADMIN — ACETAMINOPHEN 650 MG: 325 TABLET, FILM COATED ORAL at 09:55

## 2019-08-12 RX ADMIN — CLOTRIMAZOLE AND BETAMETHASONE DIPROPIONATE: 10; .5 CREAM TOPICAL at 09:53

## 2019-08-12 RX ADMIN — SODIUM CHLORIDE 125 ML/HR: 9 INJECTION, SOLUTION INTRAVENOUS at 16:26

## 2019-08-12 RX ADMIN — BUDESONIDE AND FORMOTEROL FUMARATE DIHYDRATE 2 PUFF: 80; 4.5 AEROSOL RESPIRATORY (INHALATION) at 23:58

## 2019-08-12 RX ADMIN — ASPIRIN 81 MG: 81 TABLET, COATED ORAL at 09:55

## 2019-08-12 RX ADMIN — POTASSIUM CHLORIDE 40 MEQ: 750 CAPSULE, EXTENDED RELEASE ORAL at 12:25

## 2019-08-12 RX ADMIN — MORPHINE SULFATE 4 MG: 2 INJECTION, SOLUTION INTRAMUSCULAR; INTRAVENOUS at 06:40

## 2019-08-12 RX ADMIN — CEFTRIAXONE SODIUM 1 G: 1 INJECTION, SOLUTION INTRAVENOUS at 21:06

## 2019-08-12 RX ADMIN — MORPHINE SULFATE 4 MG: 2 INJECTION, SOLUTION INTRAMUSCULAR; INTRAVENOUS at 21:12

## 2019-08-12 RX ADMIN — SODIUM CHLORIDE, PRESERVATIVE FREE 3 ML: 5 INJECTION INTRAVENOUS at 21:07

## 2019-08-12 RX ADMIN — SODIUM CHLORIDE, PRESERVATIVE FREE 3 ML: 5 INJECTION INTRAVENOUS at 09:56

## 2019-08-12 RX ADMIN — CLOTRIMAZOLE AND BETAMETHASONE DIPROPIONATE: 10; .5 CREAM TOPICAL at 21:07

## 2019-08-12 NOTE — PROGRESS NOTES
"   LOS: 0 days   Patient Care Team:  Heather Peraza APRN as PCP - General (Family Medicine)    Chief Complaint: chest pain    Subjective     Still c/o pain in substernal area that is worse with deep breath or certain changes in position. Voiding a little better now. No SOA.         Subjective:  Symptoms:  Stable.  He reports chest pain.  No shortness of breath, malaise, cough, weakness, headache, chest pressure, anorexia, diarrhea or anxiety.    Diet:  Adequate intake.  No nausea or vomiting.    Activity level: Impaired due to pain.    Pain:  He complains of pain that is moderate.  He reports pain is improving.  Pain is well controlled.        History taken from: patient chart RN    Objective     Vital Signs  Temp:  [98 °F (36.7 °C)-99.9 °F (37.7 °C)] 98 °F (36.7 °C)  Heart Rate:  [] 89  Resp:  [18] 18  BP: (116-152)/(69-95) 116/71    Objective:  General Appearance:  Comfortable.    Vital signs: (most recent): Blood pressure 116/71, pulse 89, temperature 98 °F (36.7 °C), temperature source Oral, resp. rate 18, height 190.5 cm (75\"), weight 124 kg (273 lb 6.4 oz), SpO2 95 %.  Vital signs are normal.  No fever.    Output: Producing urine (balanitis noted).    HEENT: Normal HEENT exam.    Lungs:  Normal effort and normal respiratory rate.  Breath sounds clear to auscultation.    Heart: Normal rate.  Regular rhythm.    Chest: Symmetric chest wall expansion. No chest wall tenderness.    Abdomen: Abdomen is soft.  Bowel sounds are normal.   There is generalized tenderness.  There is no rebound tenderness.  There is guarding (voluntary).  (Pt is very quick to react when I try to examine his belly, with distraction he is not very TTP at all, I think he has h/o abdominal issues that have made him very anxious regarding exam of abdomen, CT was reassuring--no acute inflammation noted).     Extremities: There is no dependent edema.  (SCDs in place)  Pulses: Distal pulses are intact.    Neurological: Patient is " alert and oriented to person, place and time.    Skin:  Warm and dry.  No rash.           Results Review:     I reviewed the patient's new clinical results.  I reviewed the patient's new imaging results and agree with the interpretation.  I reviewed the patient's other test results and agree with the interpretation  I personally viewed and interpreted the patient's EKG/Telemetry data  Discussed with pt and RN    Medication Review: reviewed and adjusted    Assessment/Plan       Chest pain    TBI (traumatic brain injury) (CMS/[facility])    Hypertension    UTI (urinary tract infection)    Coronary artery disease    Hyperlipidemia    Balanitis    Elevated MCV    Hypokalemia    Elevated d-dimer    DM2 (diabetes mellitus, type 2) (CMS/[facility])          Plan:   (Pleasant 60yo gentleman with h/o TBI, recurrent UTIs, CAD, HTN, and DM2 who was admitted with substernal chest pain  He has ruled out for ACS with neg Trop x 4, CTA chest was negative for PE  He had normal nuclear stress test in September of last year  Card feels he may have pericarditis given changes on EKG, Echo has been ordered as well as inflammatory markers  Appreciate Card attention to pt  DDimer is elevated, will check venous u/s of BLEs  Continue Rocephin for possible UTI and Lotrisone for balanitis, WBC falling  Appreciate  attention to pt  MCV elevated, will check B12 and folate  Replace K+ orally  Sugars are acceptable, HgbA1c is good at 6.00  ).       Darvin Rosales MD  08/12/19  5:50 PM    Time: 35min

## 2019-08-12 NOTE — CONSULTS
Urology Consult  Patient Identification:  Name: Kd Abraham  Age: 59 y.o.  Sex: male  :  1960  MRN: [id number]                       Chief Complaint:  Abnormal UA    History of Present Illness: Pt adm to medical team w sob and cp. Pt denies any urianry complaints. UA on admission dirty, gu consulted. CT scan no gu pathology. Pt known t me w stones, had eswl several mo ago, recently treated for balanitis and I suspect urine obtained was with contaminated      Problem List:  Active Hospital Problems    Diagnosis POA   • **Chest pain [R07.9] Yes   • TBI (traumatic brain injury) (CMS/HCC) [S06.9X9A] Unknown   • Hypertension [I10] Unknown   • UTI (urinary tract infection) [N39.0] Unknown   • Diverticulitis [K57.92] Unknown     Past Medical History:  Past Medical History:   Diagnosis Date   • Arthritis    • Asthma    • Concussion    • Coronary artery disease    • Diabetes mellitus (CMS/[facility])    • Diverticulitis    • Elevated cholesterol    • GERD (gastroesophageal reflux disease)    • Hyperlipidemia    • Hypertension    • Kidney stone    • Migraine    • TBI (traumatic brain injury) (CMS/[facility])    • Torn meniscus      Past Surgical History:  Past Surgical History:   Procedure Laterality Date   • ABDOMINAL SURGERY     • CARDIAC CATHETERIZATION     • COLON RESECTION     • COLON SURGERY     • COLONOSCOPY     • ENDOSCOPY     • HERNIA REPAIR     • SKIN BIOPSY        Home Meds:  Medications Prior to Admission   Medication Sig Dispense Refill Last Dose   • ALPRAZolam (XANAX) 0.5 MG tablet Take 0.5 mg by mouth 2 (Two) Times a Day As Needed for Anxiety.   Patient Taking Differently at Unknown time   • amLODIPine-benazepril (LOTREL) 10-20 MG per capsule Take 1 capsule by mouth Daily.      • cetirizine (zyrTEC) 10 MG tablet Take 10 mg by mouth Daily As Needed.   Patient Taking Differently at Unknown time   • esomeprazole (nexIUM) 40 MG capsule Take 40 mg by mouth Every Morning Before Breakfast.      • gabapentin (NEURONTIN)  300 MG capsule Take 300 mg by mouth 3 (Three) Times a Day.      • sertraline (ZOLOFT) 100 MG tablet Take 100 mg by mouth Daily.      • topiramate (TOPAMAX) 50 MG tablet Take 50 mg by mouth Daily.        Current Meds:   Current Facility-Administered Medications   Medication Dose Route Frequency Provider Last Rate Last Dose   • acetaminophen (TYLENOL) tablet 650 mg  650 mg Oral Q4H PRN Dominik Grossman MD   650 mg at 08/12/19 0955   • aspirin EC tablet 81 mg  81 mg Oral Daily Gina Gutierrez, APRN   81 mg at 08/12/19 0955   • cefTRIAXone (ROCEPHIN) IVPB 1 g  1 g Intravenous Q24H Dominik Grossman MD       • clotrimazole-betamethasone (LOTRISONE) 1-0.05 % cream   Topical Q12H Dominik Grossman MD       • dextrose (D50W) 25 g/ 50mL Intravenous Solution 25 g  25 g Intravenous Q15 Min PRN Dominik Grossman MD       • dextrose (GLUTOSE) oral gel 15 g  15 g Oral Q15 Min PRN Dominik Grossman MD       • glucagon (human recombinant) (GLUCAGEN DIAGNOSTIC) injection 1 mg  1 mg Subcutaneous PRN Dominik Grossman MD       • insulin lispro (humaLOG) injection 0-9 Units  0-9 Units Subcutaneous 4x Daily With Meals & Nightly Dominik Grossman MD       • morphine injection 4 mg  4 mg Intravenous Q4H PRN Dominik Grossman MD   4 mg at 08/12/19 0640   • nitroglycerin (NITROSTAT) SL tablet 0.4 mg  0.4 mg Sublingual Q5 Min PRN Dominik Grossman MD       • ondansetron (ZOFRAN) injection 4 mg  4 mg Intravenous Q6H PRN Dominik Grossman MD       • sodium chloride 0.9 % flush 10 mL  10 mL Intravenous PRN Niki Torres MD       • sodium chloride 0.9 % flush 3 mL  3 mL Intravenous Q12H Dominik Grossman MD   3 mL at 08/12/19 0956   • sodium chloride 0.9 % flush 3-10 mL  3-10 mL Intravenous PRN Dominik Grossman MD       • sodium chloride 0.9 % infusion  125 mL/hr Intravenous Continuous Niki Torres  mL/hr at 08/12/19 1626 125 mL/hr at 08/12/19 1626     Allergies:  No Known Allergies  Immunizations:    There is no immunization history on file for this patient.  Social History:  "  Social History     Tobacco Use   • Smoking status: Former Smoker     Packs/day: 0.50     Years: 15.00     Pack years: 7.50     Types: Cigarettes     Start date:      Last attempt to quit:      Years since quittin.6   • Smokeless tobacco: Never Used   Substance Use Topics   • Alcohol use: Yes     Alcohol/week: 0.6 - 1.8 oz     Types: 1 - 3 Glasses of wine per week     Comment: DRINKS 3-4X'S A WEEK      Family History:  Family History   Problem Relation Age of Onset   • Alcohol abuse Mother    • Arthritis Mother    • Asthma Mother    • Cancer Mother    • Hearing loss Mother    • Alcohol abuse Father    • Arthritis Father    • Diabetes Father    • Heart disease Father    • Hyperlipidemia Father    • Hypertension Father         Review of Systems  negative 12 point system review except:no voiding complaints, as above    Objective:  tMax 24 hrs: Temp (24hrs), Av.9 °F (37.2 °C), Min:98 °F (36.7 °C), Max:99.9 °F (37.7 °C)    Vitals Ranges:   Temp:  [98 °F (36.7 °C)-99.9 °F (37.7 °C)] 98 °F (36.7 °C)  Heart Rate:  [] 89  Resp:  [18-24] 18  BP: (116-152)/(69-95) 116/71  Intake and Output Last 3 Shifts:   I/O last 3 completed shifts:  In: 500 [IV Piggyback:500]  Out: 700 [Urine:700]    Exam:  /71 (BP Location: Right arm, Patient Position: Lying)   Pulse 89   Temp 98 °F (36.7 °C) (Oral)   Resp 18   Ht 190.5 cm (75\")   Wt 124 kg (273 lb 6.4 oz)   SpO2 95%   BMI 34.17 kg/m²      GENERAL:    Alert, cooperative, no distress, appears stated age   Head:    Normocephalic, without obvious abnormality, atraumatic   Ears:    Normal external inspection, Nl. hearing   Throat:   Lips, mucosa, and tongue normal   Back:     No CVA tenderness   Lungs:     Respirations unlabored;Normal palpation   CV;   Regular rate and rhythm, No edema   Abdomen:     Soft, non-tender,  no masses   :    resolving balanitis   Musculoskeletal:   Extremities normal,Gait Normal   Neurologic/Psych:   Orientation Normal; " Mood/Affect Normal       Data Review:   Admission on 08/11/2019   Component Date Value Ref Range Status   • Extra Tube 08/11/2019 hold for add-on   Final    Auto resulted   • Extra Tube 08/11/2019 Hold for add-ons.   Final    Auto resulted.   • Extra Tube 08/11/2019 hold for add-on   Final    Auto resulted   • Extra Tube 08/11/2019 Hold for add-ons.   Final    Auto resulted.   • Glucose 08/11/2019 166* 65 - 99 mg/dL Final   • BUN 08/11/2019 8  6 - 20 mg/dL Final   • Creatinine 08/11/2019 0.52* 0.76 - 1.27 mg/dL Final   • Sodium 08/11/2019 132* 136 - 145 mmol/L Final   • Potassium 08/11/2019 3.9  3.5 - 5.2 mmol/L Final   • Chloride 08/11/2019 95* 98 - 107 mmol/L Final   • CO2 08/11/2019 23.9  22.0 - 29.0 mmol/L Final   • Calcium 08/11/2019 9.5  8.6 - 10.5 mg/dL Final   • Total Protein 08/11/2019 8.2  6.0 - 8.5 g/dL Final   • Albumin 08/11/2019 4.70  3.50 - 5.20 g/dL Final   • ALT (SGPT) 08/11/2019 6  1 - 41 U/L Final   • AST (SGOT) 08/11/2019 12  1 - 40 U/L Final   • Alkaline Phosphatase 08/11/2019 76  39 - 117 U/L Final   • Total Bilirubin 08/11/2019 0.5  0.2 - 1.2 mg/dL Final   • eGFR   Amer 08/11/2019 >150  >60 mL/min/1.73 Final   • Globulin 08/11/2019 3.5  gm/dL Final   • A/G Ratio 08/11/2019 1.3  g/dL Final   • BUN/Creatinine Ratio 08/11/2019 15.4  7.0 - 25.0 Final   • Anion Gap 08/11/2019 13.1  5.0 - 15.0 mmol/L Final   • Protime 08/11/2019 13.9  11.7 - 14.2 Seconds Final   • INR 08/11/2019 1.10  0.90 - 1.10 Final   • Magnesium 08/11/2019 1.7  1.6 - 2.6 mg/dL Final   • Lipase 08/11/2019 13  13 - 60 U/L Final   • Color, UA 08/11/2019 Dark Yellow* Yellow, Straw Final   • Appearance, UA 08/11/2019 Cloudy* Clear Final   • pH, UA 08/11/2019 6.0  5.0 - 8.0 Final   • Specific Gravity, UA 08/11/2019 >=1.030  1.005 - 1.030 Final   • Glucose, UA 08/11/2019 >=1000 mg/dL (3+)* Negative Final   • Ketones, UA 08/11/2019 15 mg/dL (1+)* Negative Final   • Bilirubin, UA 08/11/2019 Negative  Negative Final   • Blood, UA  08/11/2019 Moderate (2+)* Negative Final   • Protein, UA 08/11/2019 Trace* Negative Final   • Leuk Esterase, UA 08/11/2019 Moderate (2+)* Negative Final   • Nitrite, UA 08/11/2019 Negative  Negative Final   • Urobilinogen, UA 08/11/2019 2.0 E.U./dL* 0.2 - 1.0 E.U./dL Final   • Amphet/Methamphet, Screen 08/11/2019 Negative  Negative Final   • Barbiturates Screen, Urine 08/11/2019 Negative  Negative Final   • Benzodiazepine Screen, Urine 08/11/2019 Negative  Negative Final   • Cocaine Screen, Urine 08/11/2019 Negative  Negative Final   • Opiate Screen 08/11/2019 Positive* Negative Final   • THC, Screen, Urine 08/11/2019 Negative  Negative Final   • Methadone Screen, Urine 08/11/2019 Negative  Negative Final   • Oxycodone Screen, Urine 08/11/2019 Negative  Negative Final   • D-Dimer, Quantitative 08/11/2019 0.63* 0.00 - 0.49 MCGFEU/mL Final   • Glucose 08/11/2019 155* 70 - 130 mg/dL Final   • Troponin T 08/11/2019 <0.010  0.000 - 0.030 ng/mL Final   • WBC 08/11/2019 18.82* 3.40 - 10.80 10*3/mm3 Final   • RBC 08/11/2019 4.02* 4.14 - 5.80 10*6/mm3 Final   • Hemoglobin 08/11/2019 14.2  13.0 - 17.7 g/dL Final   • Hematocrit 08/11/2019 40.5  37.5 - 51.0 % Final   • MCV 08/11/2019 100.7* 79.0 - 97.0 fL Final   • MCH 08/11/2019 35.3* 26.6 - 33.0 pg Final   • MCHC 08/11/2019 35.1  31.5 - 35.7 g/dL Final   • RDW 08/11/2019 13.1  12.3 - 15.4 % Final   • RDW-SD 08/11/2019 48.6  37.0 - 54.0 fl Final   • MPV 08/11/2019 10.0  6.0 - 12.0 fL Final   • Platelets 08/11/2019 363  140 - 450 10*3/mm3 Final   • Neutrophil % 08/11/2019 81.1* 42.7 - 76.0 % Final   • Lymphocyte % 08/11/2019 5.5* 19.6 - 45.3 % Final   • Monocyte % 08/11/2019 12.6* 5.0 - 12.0 % Final   • Eosinophil % 08/11/2019 0.1* 0.3 - 6.2 % Final   • Basophil % 08/11/2019 0.2  0.0 - 1.5 % Final   • Immature Grans % 08/11/2019 0.5  0.0 - 0.5 % Final   • Neutrophils, Absolute 08/11/2019 15.26* 1.70 - 7.00 10*3/mm3 Final   • Lymphocytes, Absolute 08/11/2019 1.03  0.70 - 3.10  10*3/mm3 Final   • Monocytes, Absolute 08/11/2019 2.38* 0.10 - 0.90 10*3/mm3 Final   • Eosinophils, Absolute 08/11/2019 0.01  0.00 - 0.40 10*3/mm3 Final   • Basophils, Absolute 08/11/2019 0.04  0.00 - 0.20 10*3/mm3 Final   • Immature Grans, Absolute 08/11/2019 0.10* 0.00 - 0.05 10*3/mm3 Final   • nRBC 08/11/2019 0.0  0.0 - 0.2 /100 WBC Final   • RBC, UA 08/11/2019 21-30* None Seen, 0-2 /HPF Final   • WBC, UA 08/11/2019 21-30* None Seen, 0-2 /HPF Final   • Bacteria, UA 08/11/2019 None Seen  None Seen /HPF Final   • Squamous Epithelial Cells, UA 08/11/2019 3-6* None Seen, 0-2 /HPF Final   • Hyaline Casts, UA 08/11/2019 13-20  None Seen /LPF Final   • Sperm, UA 08/11/2019 Moderate/2+* None Seen /HPF Final   • Methodology 08/11/2019 Manual Light Microscopy   Final   • Urine Culture 08/11/2019 Culture in progress   Preliminary   • Troponin T 08/12/2019 <0.010  0.000 - 0.030 ng/mL Final   • Troponin T 08/11/2019 <0.010  0.000 - 0.030 ng/mL Final   • Glucose 08/12/2019 165* 65 - 99 mg/dL Final   • BUN 08/12/2019 9  6 - 20 mg/dL Final   • Creatinine 08/12/2019 0.56* 0.76 - 1.27 mg/dL Final   • Sodium 08/12/2019 137  136 - 145 mmol/L Final   • Potassium 08/12/2019 3.3* 3.5 - 5.2 mmol/L Final   • Chloride 08/12/2019 99  98 - 107 mmol/L Final   • CO2 08/12/2019 23.7  22.0 - 29.0 mmol/L Final   • Calcium 08/12/2019 8.6  8.6 - 10.5 mg/dL Final   • Total Protein 08/12/2019 6.9  6.0 - 8.5 g/dL Final   • Albumin 08/12/2019 3.80  3.50 - 5.20 g/dL Final   • ALT (SGPT) 08/12/2019 6  1 - 41 U/L Final   • AST (SGOT) 08/12/2019 10  1 - 40 U/L Final   • Alkaline Phosphatase 08/12/2019 61  39 - 117 U/L Final   • Total Bilirubin 08/12/2019 0.5  0.2 - 1.2 mg/dL Final   • eGFR   Amer 08/12/2019 >150  >60 mL/min/1.73 Final   • Globulin 08/12/2019 3.1  gm/dL Final   • A/G Ratio 08/12/2019 1.2  g/dL Final   • BUN/Creatinine Ratio 08/12/2019 16.1  7.0 - 25.0 Final   • Anion Gap 08/12/2019 14.3  5.0 - 15.0 mmol/L Final   • WBC 08/12/2019  15.09* 3.40 - 10.80 10*3/mm3 Final   • RBC 08/12/2019 3.76* 4.14 - 5.80 10*6/mm3 Final   • Hemoglobin 08/12/2019 12.8* 13.0 - 17.7 g/dL Final   • Hematocrit 08/12/2019 38.1  37.5 - 51.0 % Final   • MCV 08/12/2019 101.3* 79.0 - 97.0 fL Final   • MCH 08/12/2019 34.0* 26.6 - 33.0 pg Final   • MCHC 08/12/2019 33.6  31.5 - 35.7 g/dL Final   • RDW 08/12/2019 13.2  12.3 - 15.4 % Final   • RDW-SD 08/12/2019 49.6  37.0 - 54.0 fl Final   • MPV 08/12/2019 9.6  6.0 - 12.0 fL Final   • Platelets 08/12/2019 308  140 - 450 10*3/mm3 Final   • Neutrophil % 08/12/2019 75.0  42.7 - 76.0 % Final   • Lymphocyte % 08/12/2019 10.3* 19.6 - 45.3 % Final   • Monocyte % 08/12/2019 13.3* 5.0 - 12.0 % Final   • Eosinophil % 08/12/2019 0.6  0.3 - 6.2 % Final   • Basophil % 08/12/2019 0.3  0.0 - 1.5 % Final   • Immature Grans % 08/12/2019 0.5  0.0 - 0.5 % Final   • Neutrophils, Absolute 08/12/2019 11.33* 1.70 - 7.00 10*3/mm3 Final   • Lymphocytes, Absolute 08/12/2019 1.55  0.70 - 3.10 10*3/mm3 Final   • Monocytes, Absolute 08/12/2019 2.00* 0.10 - 0.90 10*3/mm3 Final   • Eosinophils, Absolute 08/12/2019 0.09  0.00 - 0.40 10*3/mm3 Final   • Basophils, Absolute 08/12/2019 0.04  0.00 - 0.20 10*3/mm3 Final   • Immature Grans, Absolute 08/12/2019 0.08* 0.00 - 0.05 10*3/mm3 Final   • nRBC 08/12/2019 0.0  0.0 - 0.2 /100 WBC Final   • Troponin T 08/12/2019 <0.010  0.000 - 0.030 ng/mL Final   • Hemoglobin A1C 08/12/2019 6.00* 4.80 - 5.60 % Final   • Magnesium 08/12/2019 1.7  1.6 - 2.6 mg/dL Final   • Sed Rate 08/12/2019 56* 0 - 20 mm/hr Final   • C-Reactive Protein 08/12/2019 19.86* 0.00 - 0.50 mg/dL Final   • Glucose 08/12/2019 130  70 - 130 mg/dL Final   • Glucose 08/12/2019 131* 70 - 130 mg/dL Final       No results found.      Assessment:   ? uti w abnormal ua, suspect contaminated. No evidence of complicated uti, defer management to medical team. Pt will continue application of clotrimizole to penis for balantitis.      Plan:   Pt has enid ernandez as OP.  Call if questions.      Kang Boyle MD  8/12/2019

## 2019-08-12 NOTE — CONSULTS
[facility] CONSULT        Subjective:     Encounter Date:08/11/2019      Patient ID:     Chief Complaint: chest pain      HPI:  Kd Abraham is a 59 y.o. male known to Dr. Gonzalez (who does not come here) with a past cardiac history of mild non-obstructive CAD per cath 9/2013 (record unavailable) and a PMH HTN, HLD, and DM.  He has had normal nuclear stress testing at  9/2018.  Last 2D echo 12/2018 showed EF = 66% without significant valvular heart disease.  Patient presents with c/o CP and abdominal pain, and cardiology was consulted to evaluate CP.  Pt reports that yesterday he developed midsternal CP at rest radiating to his abdomen and accompanied by dizziness.  The pain was intermittent at first then became constant.  It is described as a pressure and sharp pain worse with breathing and unrelieved by nitro.  He denies SOA or PND/orthopnea.  Pt is relatively sedentary with activity limitations secondary to knee pain, but he states he can typically ambulate down a flight a stairs without unusual difficulty.      Past Medical History:   Diagnosis Date   • Arthritis    • Asthma    • Concussion    • Coronary artery disease    • Diabetes mellitus (CMS/HCC)    • Diverticulitis    • Elevated cholesterol    • GERD (gastroesophageal reflux disease)    • Hyperlipidemia    • Hypertension    • Kidney stone    • Migraine    • TBI (traumatic brain injury) (CMS/HCC)    • Torn meniscus          Past Surgical History:   Procedure Laterality Date   • ABDOMINAL SURGERY     • CARDIAC CATHETERIZATION     • COLON RESECTION     • COLON SURGERY     • COLONOSCOPY     • ENDOSCOPY     • HERNIA REPAIR     • SKIN BIOPSY           Social History     Socioeconomic History   • Marital status: Unknown     Spouse name: Not on file   • Number of children: Not on file   • Years of education: Not on file   • Highest education level: Not on file   Tobacco Use   • Smoking status: Former Smoker     Packs/day: 0.50     Years: 15.00  "    Pack years: 7.50     Types: Cigarettes     Start date:      Last attempt to quit:      Years since quittin.6   • Smokeless tobacco: Never Used   Substance and Sexual Activity   • Alcohol use: Yes     Alcohol/week: 0.6 - 1.8 oz     Types: 1 - 3 Glasses of wine per week     Comment: DRINKS 3-4X'S A WEEK   • Drug use: No   • Sexual activity: Defer     Birth control/protection: None     Lives alone.  Quit smoking one year ago.  Drinks 1-3 ETOH beverages three times weekly.  No illicit drug use.     No Known Allergies    Current Medications:   Scheduled Meds:  ceftriaxone 1 g Intravenous Q24H   clotrimazole-betamethasone  Topical Q12H   insulin lispro 0-9 Units Subcutaneous 4x Daily With Meals & Nightly   potassium chloride 40 mEq Oral Once   sodium chloride 3 mL Intravenous Q12H     Continuous Infusions:  sodium chloride 125 mL/hr Last Rate: 125 mL/hr (19 2329)       ROS  All other systems reviewed and are negative.       Objective:         /80 (BP Location: Right arm, Patient Position: Lying)   Pulse 101   Temp 99 °F (37.2 °C) (Oral)   Resp 18   Ht 190.5 cm (75\")   Wt 124 kg (273 lb 6.4 oz)   SpO2 95%   BMI 34.17 kg/m²       General: Well-developed in NAD.  Neuro: AAOx3.  HEENT: sclera clear, no xanthalasmas.  CV: Distant S1S2 RRR. No murmurs or gallops. No JVD.  Resp: Breathing is unlabored but shallow. Lungs CTA/dim throughout.  GI: BS+. Abdomen guarding.  Ext: Pedal pulses are palpable. Extremities are non-edematous.  MS: moves all extremities, no weakness.  Skin: warm, dry.  Psych: calm and cooperative.            Lab Review:     Results from last 7 days   Lab Units 19  0421 19  1730   SODIUM mmol/L 137 132*   POTASSIUM mmol/L 3.3* 3.9   CHLORIDE mmol/L 99 95*   CO2 mmol/L 23.7 23.9   BUN mg/dL 9 8   CREATININE mg/dL 0.56* 0.52*   GLUCOSE mg/dL 165* 166*   CALCIUM mg/dL 8.6 9.5   AST (SGOT) U/L 10 12   ALT (SGPT) U/L 6 6     Results from last 7 days   Lab Units " 08/12/19  0421 08/11/19  2341 08/11/19  1730   TROPONIN T ng/mL <0.010 <0.010 <0.010     Results from last 7 days   Lab Units 08/12/19  0421 08/11/19  1730   WBC 10*3/mm3 15.09* 18.82*   HEMOGLOBIN g/dL 12.8* 14.2   HEMATOCRIT % 38.1 40.5   PLATELETS 10*3/mm3 308 363     Results from last 7 days   Lab Units 08/11/19  1730   INR  1.10     Results from last 7 days   Lab Units 08/11/19  1730   MAGNESIUM mg/dL 1.7           Invalid input(s): LDLCALC            Recent Radiology:  Imaging Results (most recent)     Procedure Component Value Units Date/Time    CT Angiogram Chest With Contrast [[id number]] Collected:  08/11/19 1911     Updated:  08/11/19 1926    Narrative:       CT ANGIOGRAM THORAX WITH CONTRAST, PULMONARY EMBOLISM PROTOCOL     HISTORY: Pulmonary embolism.     COMPARISON: None.     TECHNIQUE: Radiation dose reduction techniques were utilized, including  automated exposure control and exposure modulation based on body size.  Axial contrast-enhanced images of the chest were obtained according to  the pulmonary embolism protocol. Coronal oblique 3-D MIP reformatted  images were supplemented and reviewed.  100 mls of non ionic contrast  was utilized intravenously.     FINDINGS CHEST CT: Minimal bullous and fibrous changes are noted in the  lung apices, right greater than left. Dependent densities in the lower  lobes, right greater than left favored to be related to atelectasis  rather than pneumonia. There is no convincing evidence of active air  space disease process otherwise. IV contrast bolus is less than optimal  for pulmonary embolism assessment. There is no central or large proximal  embolus. There are no secondary signs of PE in the parenchyma. Aorta  mildly ectatic but nonaneurysmal. Mild cardiomegaly.             Impression:       1. Dependent lower lobe opacities are favored to be related to areas of  atelectasis, no active airspace disease.  2. Suboptimal IV contrast bolus but no central or large  proximal PE  demonstrated.                    CT SCANS ABDOMEN AND PELVIS WITH IV CONTRAST.     HISTORY: eval for PE     COMPARISON: None.     TECHNIQUE: Radiation dose reduction techniques were utilized, including  automated exposure control and exposure modulation based on body size.  Axial images were obtained from the lung bases to the symphysis pubis  with IV contrast only.. Oral contrast was not administered per request.     FINDINGS :  There are a couple of subcentimeter renal lesions  bilaterally. The lesions are too small for accurate or detailed  assessment. Small cysts are favored, particularly if there is no history  of malignancy.  Suggest follow-up to ensure stability. Remaining solid  organs have an unremarkable appearance. Normal aorta and appendix. Mild  diverticulosis. Mild constipation. The GI tract not opacified for  assessment but non obstructive in appearance. There is a fat-containing  ventral hernia to the left of midline without associated inflammatory  change.           IMPRESSION :   1. Tiny renal lesions as discussed.  2. Constipation, diverticulosis, no obstruction or acute inflammation        This report was finalized on 8/11/2019 7:22 PM by Christian Hutson M.D.       CT Abdomen Pelvis With Contrast [[id number]] Collected:  08/11/19 1911     Updated:  08/11/19 1926    Narrative:       CT ANGIOGRAM THORAX WITH CONTRAST, PULMONARY EMBOLISM PROTOCOL     HISTORY: Pulmonary embolism.     COMPARISON: None.     TECHNIQUE: Radiation dose reduction techniques were utilized, including  automated exposure control and exposure modulation based on body size.  Axial contrast-enhanced images of the chest were obtained according to  the pulmonary embolism protocol. Coronal oblique 3-D MIP reformatted  images were supplemented and reviewed.  100 mls of non ionic contrast  was utilized intravenously.     FINDINGS CHEST CT: Minimal bullous and fibrous changes are noted in the  lung apices, right greater  than left. Dependent densities in the lower  lobes, right greater than left favored to be related to atelectasis  rather than pneumonia. There is no convincing evidence of active air  space disease process otherwise. IV contrast bolus is less than optimal  for pulmonary embolism assessment. There is no central or large proximal  embolus. There are no secondary signs of PE in the parenchyma. Aorta  mildly ectatic but nonaneurysmal. Mild cardiomegaly.             Impression:       1. Dependent lower lobe opacities are favored to be related to areas of  atelectasis, no active airspace disease.  2. Suboptimal IV contrast bolus but no central or large proximal PE  demonstrated.                    CT SCANS ABDOMEN AND PELVIS WITH IV CONTRAST.     HISTORY: eval for PE     COMPARISON: None.     TECHNIQUE: Radiation dose reduction techniques were utilized, including  automated exposure control and exposure modulation based on body size.  Axial images were obtained from the lung bases to the symphysis pubis  with IV contrast only.. Oral contrast was not administered per request.     FINDINGS :  There are a couple of subcentimeter renal lesions  bilaterally. The lesions are too small for accurate or detailed  assessment. Small cysts are favored, particularly if there is no history  of malignancy.  Suggest follow-up to ensure stability. Remaining solid  organs have an unremarkable appearance. Normal aorta and appendix. Mild  diverticulosis. Mild constipation. The GI tract not opacified for  assessment but non obstructive in appearance. There is a fat-containing  ventral hernia to the left of midline without associated inflammatory  change.           IMPRESSION :   1. Tiny renal lesions as discussed.  2. Constipation, diverticulosis, no obstruction or acute inflammation        This report was finalized on 8/11/2019 7:22 PM by Christian Hutson M.D.       XR Chest 2 View [[id number]] Collected:  08/11/19 1804     Updated:  08/11/19  1809    Narrative:       PA AND LATERAL CHEST     CLINICAL HISTORY: Chest pain     The lungs are fairly well-expanded and appear free of focal infiltrates  or masses. There is no pneumothorax. Tiny bilateral pleural effusions  are evident. The heart is within normal limits in size. The pulmonary  vasculature is within normal limits.     IMPRESSIONS: Tiny bilateral pleural effusions. Otherwise unremarkable  chest x-ray.     This report was finalized on 8/11/2019 6:06 PM by Dr. Iván Tang M.D.               ECHOCARDIOGRAM:               Assessment:         Active Hospital Problems    Diagnosis POA   • **Chest pain [R07.9] Yes   • TBI (traumatic brain injury) (CMS/HCC) [S06.9X9A] Unknown   • Hypertension [I10] Unknown   • UTI (urinary tract infection) [N39.0] Unknown   • Diverticulitis [K57.92] Unknown     1) Atypical CP  - negative CE x 3 sets  - EKG with J-point elevation inferior lateral  - CTA chest negative for PE    2) Abnormal EKG  - EKG with J-point elevation inferior lateral (pericarditis?)  - Check CRP, ESR  - Check 2D echo    3) Abdominal Pain  - per primary team    4) Mild non-obstructive CAD  - per cath 9/3013 at [facility] (report unavailable)  - normal NST at  9/2018  - 2D echo 12/2018 at  showed EF = 66% without significant VHD  - continue aspirin    5) HTN  - controlled    6) HLD     7) DM    8) Hypokalemia  - replace  - check Mg         Plan:   Pt has been ruled out for ACS.  CP is atypical with negative non-invasive ischemic eval in last year.  EKG with generalized ST elevations, pericarditis?  Will check CRP, ESR, 2D echo, and review EKG/case with attending cardiologist.  Replete K and check Mg.  Thank you for allowing us to participate in patient's care.                Gina Gutierrez, XENA  08/12/19  8:12 AM    I have seen and examined the patient.  His chest discomfort is atypical for coronary heart disease.  He has a sharp component and some tightness which is primarily present with  deep inspiration.  His physical exam reveals stable vital signs, normal S1-S2 no S3 or murmur appreciated positive S4, lungs clear to auscultation but with deep inspiration clear reproduction of chest discomfort.  I agree with above assessment and treatment plan.  We will follow the patient with you and review his echocardiogram.  EKG from 8/11/2019 reveals sinus tachycardia at 101 bpm, PQ elevation in aVR, and mild diffuse ST elevation suggestive of pericarditis.    Lane Soliman MD  8/12/2019  12:58 PM

## 2019-08-12 NOTE — PLAN OF CARE
Problem: Patient Care Overview  Goal: Plan of Care Review  Outcome: Ongoing (interventions implemented as appropriate)   08/12/19 0616   Coping/Psychosocial   Plan of Care Reviewed With patient   Plan of Care Review   Progress improving   OTHER   Outcome Summary Pt a&ox4. Admitted w/ c/o chest pain throughout the day. C/o chest pain while coming on unit. morphine administered. Pt stated that it took care of his pain and that he was now able to move around in bed without the pain in the chest. Received 1g Rocephin in the er. Currently has NS IVF @ 125cc/hr infusing. Currently resting comfortably in bed. VS stable. Will continue to monitor.      Goal: Individualization and Mutuality  Outcome: Ongoing (interventions implemented as appropriate)    Goal: Discharge Needs Assessment  Outcome: Ongoing (interventions implemented as appropriate)    Goal: Interprofessional Rounds/Family Conf  Outcome: Ongoing (interventions implemented as appropriate)      Problem: Fall Risk (Adult)  Goal: Identify Related Risk Factors and Signs and Symptoms  Outcome: Ongoing (interventions implemented as appropriate)    Goal: Absence of Fall  Outcome: Ongoing (interventions implemented as appropriate)      Problem: Pain, Acute (Adult)  Goal: Identify Related Risk Factors and Signs and Symptoms  Outcome: Ongoing (interventions implemented as appropriate)    Goal: Acceptable Pain Control/Comfort Level  Outcome: Ongoing (interventions implemented as appropriate)

## 2019-08-12 NOTE — H&P
Internal medicine history and physical  INTERNAL MEDICINE   [facility]       Patient Identification:  Name: Kd Abraham  Age: 59 y.o.  Sex: male  :  1960  MRN: [id number]                   Primary Care Physician: Heather Peraza APRN                                   Chief Complaint: Sudden onset of central chest discomfort that went up and down in the chest started 9:00 in the morning while watching TV.  Pain has been persistent since then and gets worse with coughing taking deep breath and movement.    History of Present Illness:   Patient is a 59-year-old male with complicated past medical history as noted below including history of traumatic brain injury diabetes mellitus and previous history of urinary tract infection and balanitis requiring antifungal treatments has been battling with urinary tract infection frequency and urgency for the last couple of weeks.  In that background he was a feeling otherwise fine until this morning when he woke up feeling his usual self and carried his morning routines.  Afterwards he was sitting in the couch watching TV and around 9:00 in the morning he suddenly felt severe discomfort in the chest which is mainly in the center of the chest going up and down.  He did not have any associated shortness of breath or nausea and he did not break out in the site.  The pain was not radiating to his jaw or shoulder and only thing that was making it worse with movement including taking deep breaths and cough.  After few minutes pain did not go away resulting in him calling EMS.  Patient still have discomfort in the chest not as bad but he was never free of pain since his onset at 9 AM.  Patient admits to have low-grade fever and chills and burning in urination frequency and urgency for couple weeks and he was planning to see his urologist.  He has these episodes before.      Past Medical History:  Past Medical History:   Diagnosis Date   • Arthritis    •  Asthma    • Concussion    • Coronary artery disease    • Diabetes mellitus (CMS/[facility])    • Diverticulitis    • Elevated cholesterol    • GERD (gastroesophageal reflux disease)    • Hyperlipidemia    • Hypertension    • Kidney stone    • Migraine    • TBI (traumatic brain injury) (CMS/[facility])    • Torn meniscus      Past Surgical History:  Past Surgical History:   Procedure Laterality Date   • ABDOMINAL SURGERY     • CARDIAC CATHETERIZATION     • COLON RESECTION     • COLON SURGERY     • COLONOSCOPY     • ENDOSCOPY     • HERNIA REPAIR     • SKIN BIOPSY        Home Meds:  Medications Prior to Admission   Medication Sig Dispense Refill Last Dose   • ALPRAZolam (XANAX) 0.5 MG tablet Take 0.5 mg by mouth 3 (Three) Times a Day As Needed for Anxiety.      • cetirizine (zyrTEC) 10 MG tablet Take 10 mg by mouth Daily.      • gabapentin (NEURONTIN) 300 MG capsule Take 300 mg by mouth 3 (Three) Times a Day.      • sertraline (ZOLOFT) 100 MG tablet Take 100 mg by mouth Daily.      • topiramate (TOPAMAX) 50 MG tablet Take 50 mg by mouth Daily.        Current Meds:     Current Facility-Administered Medications:   •  acetaminophen (TYLENOL) tablet 650 mg, 650 mg, Oral, Q4H PRN, Dominik Grossman MD  •  cefTRIAXone (ROCEPHIN) IVPB 1 g, 1 g, Intravenous, Q24H, Dominik Grossman MD  •  morphine injection 4 mg, 4 mg, Intravenous, Q4H PRN, Dominik Grossman MD, 4 mg at 08/11/19 9628  •  nitroglycerin (NITROSTAT) SL tablet 0.4 mg, 0.4 mg, Sublingual, Q5 Min PRN, Dominik Grossman MD  •  ondansetron (ZOFRAN) injection 4 mg, 4 mg, Intravenous, Q6H PRN, Dominik Grossman MD  •  sodium chloride 0.9 % flush 10 mL, 10 mL, Intravenous, PRNMelissa Tara L, MD  •  sodium chloride 0.9 % flush 3 mL, 3 mL, Intravenous, Q12H, Dominik Grossman MD  •  sodium chloride 0.9 % flush 3-10 mL, 3-10 mL, Intravenous, PRN, Dominik Grossman MD  •  sodium chloride 0.9 % infusion, 125 mL/hr, Intravenous, Continuous, Niki Torres MD, Last Rate: 125 mL/hr at 08/11/19 2329, 125 mL/hr at 08/11/19  "2329  Allergies:  No Known Allergies  Social History:   Social History     Tobacco Use   • Smoking status: Former Smoker     Packs/day: 0.50     Years: 15.00     Pack years: 7.50     Types: Cigarettes     Start date:      Last attempt to quit:      Years since quittin.6   • Smokeless tobacco: Never Used   Substance Use Topics   • Alcohol use: Yes     Alcohol/week: 0.6 - 1.8 oz     Types: 1 - 3 Glasses of wine per week     Comment: DRINKS 3-4X'S A WEEK      Family History:  Family History   Problem Relation Age of Onset   • Alcohol abuse Mother    • Arthritis Mother    • Asthma Mother    • Cancer Mother    • Hearing loss Mother    • Alcohol abuse Father    • Arthritis Father    • Diabetes Father    • Heart disease Father    • Hyperlipidemia Father    • Hypertension Father           Review of Systems  See history of present illness and past medical history.    Constitutional: Remarkable for feeling weak and has subjective fever and chills  Cardiovascular: Remarkable for chest pain which has been described with associated worsening with movement and cough.  Respiratory: Remarkable for mild shortness of breath with activity but denies any cough hemoptysis.  : Remarkable for burning in urination frequency and urgency and pain for couple weeks  GI: Remarkable for preserved appetite no nausea and vomiting did have some abdominal discomfort.  Musculoskeletal: Remarkable for chronic discomfort in his back  Neurological remarkable for no loss of consciousness or continence.    Vitals:   /80 (BP Location: Right arm, Patient Position: Lying)   Pulse 101   Temp 99 °F (37.2 °C) (Oral)   Resp 18   Ht 190.5 cm (75\")   Wt 124 kg (273 lb 6.4 oz)   SpO2 95%   BMI 34.17 kg/m²   I/O:     Gross per 24 hour   Intake 500 ml   Output 350 ml   Net 150 ml     Exam:  General Appearance:    Alert, cooperative, no distress, appears stated age   Head:    Normocephalic, without obvious abnormality, atraumatic   Eyes:    " PERRL, conjunctiva/corneas clear, EOM's intact, both eyes   Ears:    Normal external ear canals, both ears   Nose:   Nares normal, septum midline, mucosa normal, no drainage    or sinus tenderness   Throat:   Lips, tongue, gums normal; oral mucosa pink and moist   Neck:   Supple, symmetrical, trachea midline, no adenopathy;     thyroid:  no enlargement/tenderness/nodules; no carotid    bruit or JVD   Back:     Symmetric, no curvature, ROM normal, no CVA tenderness   Lungs:     Clear to auscultation bilaterally, respirations unlabored   Chest Wall:   Significant chest wall tenderness bilaterally along the sternal border    Heart:    Regular rate and rhythm, S1 and S2 normal, no murmur, rub   or gallop   Abdomen:     Soft, non-tender, bowel sounds active all four quadrants,     no masses, no hepatomegaly, no splenomegaly : Remarkable for uncircumcised penis with balanitis type findings.   Extremities:   Extremities normal, atraumatic, bilateral trace edema   Pulses:   Pulses palpable in all extremities; symmetric all extremities   Skin:   Skin color normal, Skin is warm and dry,  no rashes or palpable lesions   Neurologic:  Grossly nonfocal       Data Review:      I reviewed the patient's new clinical results.  Results from last 7 days   Lab Units 08/11/19  1730   WBC 10*3/mm3 18.82*   HEMOGLOBIN g/dL 14.2   PLATELETS 10*3/mm3 363     Results from last 7 days   Lab Units 08/11/19  1730   SODIUM mmol/L 132*   POTASSIUM mmol/L 3.9   CHLORIDE mmol/L 95*   CO2 mmol/L 23.9   BUN mg/dL 8   CREATININE mg/dL 0.52*   CALCIUM mg/dL 9.5   GLUCOSE mg/dL 166*     Ct Abdomen Pelvis With Contrast    Result Date: 8/11/2019  1. Dependent lower lobe opacities are favored to be related to areas of atelectasis, no active airspace disease. 2. Suboptimal IV contrast bolus but no central or large proximal PE demonstrated.       CT SCANS ABDOMEN AND PELVIS WITH IV CONTRAST.  HISTORY: eval for PE  COMPARISON: None.  TECHNIQUE: Radiation  dose reduction techniques were utilized, including automated exposure control and exposure modulation based on body size. Axial images were obtained from the lung bases to the symphysis pubis with IV contrast only.. Oral contrast was not administered per request.  FINDINGS :  There are a couple of subcentimeter renal lesions bilaterally. The lesions are too small for accurate or detailed assessment. Small cysts are favored, particularly if there is no history of malignancy.  Suggest follow-up to ensure stability. Remaining solid organs have an unremarkable appearance. Normal aorta and appendix. Mild diverticulosis. Mild constipation. The GI tract not opacified for assessment but non obstructive in appearance. There is a fat-containing ventral hernia to the left of midline without associated inflammatory change.    IMPRESSION : 1. Tiny renal lesions as discussed. 2. Constipation, diverticulosis, no obstruction or acute inflammation   This report was finalized on 8/11/2019 7:22 PM by Christian Hutson M.D.      Ct Angiogram Chest With Contrast    Result Date: 8/11/2019  1. Dependent lower lobe opacities are favored to be related to areas of atelectasis, no active airspace disease. 2. Suboptimal IV contrast bolus but no central or large proximal PE demonstrated.       CT SCANS ABDOMEN AND PELVIS WITH IV CONTRAST.  HISTORY: eval for PE  COMPARISON: None.  TECHNIQUE: Radiation dose reduction techniques were utilized, including automated exposure control and exposure modulation based on body size. Axial images were obtained from the lung bases to the symphysis pubis with IV contrast only.. Oral contrast was not administered per request.  FINDINGS :  There are a couple of subcentimeter renal lesions bilaterally. The lesions are too small for accurate or detailed assessment. Small cysts are favored, particularly if there is no history of malignancy.  Suggest follow-up to ensure stability. Remaining solid organs have an  unremarkable appearance. Normal aorta and appendix. Mild diverticulosis. Mild constipation. The GI tract not opacified for assessment but non obstructive in appearance. There is a fat-containing ventral hernia to the left of midline without associated inflammatory change.    IMPRESSION : 1. Tiny renal lesions as discussed. 2. Constipation, diverticulosis, no obstruction or acute inflammation   This report was finalized on 8/11/2019 7:22 PM by Christian Hutson M.D.      ECG 12 Lead   Final Result   HEART RATE= 101  bpm   RR Interval= 596  ms   WA Interval= 194  ms   P Horizontal Axis= 264  deg   P Front Axis= 60  deg   QRSD Interval= 80  ms   QT Interval= 332  ms   QRS Axis= 13  deg   T Wave Axis= 28  deg   - ABNORMAL ECG -   Sinus tachycardia   Ventricular premature complex   Mild inferior ST elevations- consider inferior injury versus pericarditis   NO PRIOR TRACING AVAILABLE FOR COMPARISON   Electronically Signed By: Suzy Ramirez ([facility]) 11-Aug-2019 20:59:39   Date and Time of Study: 2019-08-11 17:23:39      ECG 12 Lead    (Results Pending)         Assessment:  Active Hospital Problems    Diagnosis POA   • **Chest pain [R07.9] Yes   • TBI (traumatic brain injury) (CMS/[facility]) [S06.9X9A] Unknown   • Hypertension [I10] Unknown   • UTI (urinary tract infection) [N39.0] Unknown   • Diverticulitis [K57.92] Unknown       Medical decision making:  Chest pain-likely due to chest wall pain as he has significant historical and physical exam findings suggestive of underlying condition such as costochondritis or muscle strain or evolving pleuritic viral process.  Plan is to continue with serial troponin, symptomatic management and consider cardiology evaluation if the character and pattern of the pain changes or if he shows rising troponin level.  Hypertension-continue his antihypertensive regimen  Abnormal urinalysis with urinary symptoms and leukocytosis likely due to UTI plan is to follow-up on urine culture and start him on  Rocephin 1 g every 24 while awaiting culture results.  Consider urology evaluation.  Candidal balanitis-provide with local Lotrisone cream twice a day after retraction of his foreskin.  Diabetes mellitus-continue with Accu-Cheks and sliding scale coverage and check hemoglobin A1c.    Dominik Grossmna MD   8/11/2019  8:40 PM   Much  of this encounter note is an electronic transcription/translation of spoken language to printed text. The electronic translation of spoken language may permit erroneous, or at times, nonsensical words or phrases to be inadvertently transcribed; Although I have reviewed the note for such errors, some may still exist

## 2019-08-12 NOTE — PROGRESS NOTES
Discharge Planning Assessment  [facility]     Patient Name: Kd Abraham  MRN: [id number]  Today's Date: 8/12/2019    Admit Date: 8/11/2019    Discharge Needs Assessment     Row Name 08/12/19 1451       Living Environment    Lives With  alone    Current Living Arrangements  home/apartment/condo    Potentially Unsafe Housing Conditions  other (see comments) no concerns     Primary Care Provided by  self    Provides Primary Care For  no one    Family Caregiver if Needed  child(gema), adult    Quality of Family Relationships  helpful;involved;supportive    Able to Return to Prior Arrangements  yes       Resource/Environmental Concerns    Resource/Environmental Concerns  none    Transportation Concerns  car, none       Transition Planning    Patient/Family Anticipates Transition to  home    Patient/Family Anticipated Services at Transition  none       Discharge Needs Assessment    Readmission Within the Last 30 Days  no previous admission in last 30 days    Concerns to be Addressed  no discharge needs identified;denies needs/concerns at this time    Equipment Currently Used at Home  cane, straight;walker, rolling    Anticipated Changes Related to Illness  none    Equipment Needed After Discharge  none        Discharge Plan     Row Name 08/12/19 1451       Plan    Plan  Home; follow for IV abx and 02 needs     Patient/Family in Agreement with Plan  yes    Plan Comments  CCP met with patient at bedside. CCP role explained and discharge planning discussed. Face sheet verified. Patient's APRN is Heather Peraza. Patient lives alone, with no interior/exterior steps. Patient has a walker and cane he uses as needed. Patient denies any HH/SNF history. Patient reports he recently applied for social security disability but has not heard back yet. Patient reports his preferred pharmacy is [email or web address] on [address] [facility]; patient denies having trouble affording his medications. Patient states he does not have  transportation home. CCP will follow for IV abx and 02 needs. Trish Magdaleno CSW              Destination      No service coordination in this encounter.      Durable Medical Equipment      No service coordination in this encounter.      Dialysis/Infusion      No service coordination in this encounter.      Home Medical Care      No service coordination in this encounter.      Therapy      No service coordination in this encounter.      Community Resources      No service coordination in this encounter.          Demographic Summary     Row Name 08/12/19 1450       General Information    Admission Type  observation    Referral Source  admission list    Reason for Consult  discharge planning    Preferred Language  English     Used During This Interaction  no        Functional Status     Row Name 08/12/19 1450       Functional Status    Usual Activity Tolerance  good    Current Activity Tolerance  good       Functional Status, IADL    Medications  assistive equipment    Meal Preparation  assistive equipment    Housekeeping  assistive equipment    Laundry  assistive equipment    Shopping  assistive equipment       Mental Status    General Appearance WDL  WDL       Mental Status Summary    Recent Changes in Mental Status/Cognitive Functioning  no changes        Psychosocial    No documentation.       Abuse/Neglect    No documentation.       Legal    No documentation.       Substance Abuse    No documentation.       Patient Forms    No documentation.           ROWENA Tripp

## 2019-08-12 NOTE — PLAN OF CARE
Problem: Patient Care Overview  Goal: Plan of Care Review   08/12/19 1316   Coping/Psychosocial   Plan of Care Reviewed With patient   Plan of Care Review   Progress improving   OTHER   Outcome Summary Pt a&ox4. c/o HA, prn tylenol given. Pt recieving ns@125mL/hr per MD order. Pt PTA meds confirmed with [facility] pharmacy. Pt in bed resting, will continue to [facility].     Goal: Individualization and Mutuality  Outcome: Ongoing (interventions implemented as appropriate)    Goal: Discharge Needs Assessment  Outcome: Ongoing (interventions implemented as appropriate)    Goal: Interprofessional Rounds/Family Conf  Outcome: Ongoing (interventions implemented as appropriate)      Problem: Fall Risk (Adult)  Goal: Identify Related Risk Factors and Signs and Symptoms  Outcome: Ongoing (interventions implemented as appropriate)    Goal: Absence of Fall  Outcome: Ongoing (interventions implemented as appropriate)      Problem: Pain, Acute (Adult)  Goal: Identify Related Risk Factors and Signs and Symptoms  Outcome: Ongoing (interventions implemented as appropriate)    Goal: Acceptable Pain Control/Comfort Level  Outcome: Ongoing (interventions implemented as appropriate)      Problem: Skin Injury Risk (Adult)  Goal: Identify Related Risk Factors and Signs and Symptoms  Outcome: Ongoing (interventions implemented as appropriate)    Goal: Skin Health and Integrity  Outcome: Ongoing (interventions implemented as appropriate)

## 2019-08-13 ENCOUNTER — APPOINTMENT (OUTPATIENT)
Dept: CARDIOLOGY | Facility: HOSPITAL | Age: 59
End: 2019-08-13

## 2019-08-13 PROBLEM — E53.8 FOLATE DEFICIENCY: Status: ACTIVE | Noted: 2019-08-13

## 2019-08-13 LAB
ALBUMIN SERPL-MCNC: 3.5 G/DL (ref 3.5–5.2)
ALBUMIN/GLOB SERPL: 1 G/DL
ALP SERPL-CCNC: 58 U/L (ref 39–117)
ALT SERPL W P-5'-P-CCNC: 6 U/L (ref 1–41)
ANION GAP SERPL CALCULATED.3IONS-SCNC: 16.5 MMOL/L (ref 5–15)
AORTIC DIMENSIONLESS INDEX: 0.9 (DI)
AST SERPL-CCNC: 11 U/L (ref 1–40)
BASOPHILS # BLD AUTO: 0.04 10*3/MM3 (ref 0–0.2)
BASOPHILS NFR BLD AUTO: 0.4 % (ref 0–1.5)
BH CV ECHO MEAS - ACS: 2.2 CM
BH CV ECHO MEAS - AI DEC SLOPE: 183.5 CM/SEC^2
BH CV ECHO MEAS - AI MAX PG: 67.2 MMHG
BH CV ECHO MEAS - AI MAX VEL: 410 CM/SEC
BH CV ECHO MEAS - AI P1/2T: 654.4 MSEC
BH CV ECHO MEAS - AO MAX PG (FULL): 1.1 MMHG
BH CV ECHO MEAS - AO MAX PG: 4.8 MMHG
BH CV ECHO MEAS - AO MEAN PG (FULL): 1 MMHG
BH CV ECHO MEAS - AO MEAN PG: 3 MMHG
BH CV ECHO MEAS - AO ROOT AREA (BSA CORRECTED): 1.6
BH CV ECHO MEAS - AO ROOT AREA: 12.6 CM^2
BH CV ECHO MEAS - AO ROOT DIAM: 4 CM
BH CV ECHO MEAS - AO V2 MAX: 109 CM/SEC
BH CV ECHO MEAS - AO V2 MEAN: 72.8 CM/SEC
BH CV ECHO MEAS - AO V2 VTI: 23.8 CM
BH CV ECHO MEAS - ASC AORTA: 3.7 CM
BH CV ECHO MEAS - AVA(I,A): 3.3 CM^2
BH CV ECHO MEAS - AVA(I,D): 3.3 CM^2
BH CV ECHO MEAS - AVA(V,A): 3.3 CM^2
BH CV ECHO MEAS - AVA(V,D): 3.3 CM^2
BH CV ECHO MEAS - BSA(HAYCOCK): 2.6 M^2
BH CV ECHO MEAS - BSA: 2.5 M^2
BH CV ECHO MEAS - BZI_BMI: 34.1 KILOGRAMS/M^2
BH CV ECHO MEAS - BZI_METRIC_HEIGHT: 190.5 CM
BH CV ECHO MEAS - BZI_METRIC_WEIGHT: 123.8 KG
BH CV ECHO MEAS - EDV(CUBED): 110.6 ML
BH CV ECHO MEAS - EDV(MOD-SP2): 119 ML
BH CV ECHO MEAS - EDV(MOD-SP4): 133 ML
BH CV ECHO MEAS - EDV(TEICH): 107.5 ML
BH CV ECHO MEAS - EF(CUBED): 57.8 %
BH CV ECHO MEAS - EF(MOD-BP): 56.6 %
BH CV ECHO MEAS - EF(MOD-SP2): 50.4 %
BH CV ECHO MEAS - EF(MOD-SP4): 60.9 %
BH CV ECHO MEAS - EF(TEICH): 49.4 %
BH CV ECHO MEAS - ESV(CUBED): 46.7 ML
BH CV ECHO MEAS - ESV(MOD-SP2): 59 ML
BH CV ECHO MEAS - ESV(MOD-SP4): 52 ML
BH CV ECHO MEAS - ESV(TEICH): 54.4 ML
BH CV ECHO MEAS - FS: 25 %
BH CV ECHO MEAS - IVS/LVPW: 1
BH CV ECHO MEAS - IVSD: 1.1 CM
BH CV ECHO MEAS - LAT PEAK E' VEL: 10.8 CM/SEC
BH CV ECHO MEAS - LV DIASTOLIC VOL/BSA (35-75): 53.1 ML/M^2
BH CV ECHO MEAS - LV MASS(C)D: 194 GRAMS
BH CV ECHO MEAS - LV MASS(C)DI: 77.4 GRAMS/M^2
BH CV ECHO MEAS - LV MAX PG: 3.6 MMHG
BH CV ECHO MEAS - LV MEAN PG: 2 MMHG
BH CV ECHO MEAS - LV SYSTOLIC VOL/BSA (12-30): 20.8 ML/M^2
BH CV ECHO MEAS - LV V1 MAX: 95.3 CM/SEC
BH CV ECHO MEAS - LV V1 MEAN: 62.5 CM/SEC
BH CV ECHO MEAS - LV V1 VTI: 20.5 CM
BH CV ECHO MEAS - LVIDD: 4.8 CM
BH CV ECHO MEAS - LVIDS: 3.6 CM
BH CV ECHO MEAS - LVLD AP2: 9.3 CM
BH CV ECHO MEAS - LVLD AP4: 10.2 CM
BH CV ECHO MEAS - LVLS AP2: 8.1 CM
BH CV ECHO MEAS - LVLS AP4: 8.8 CM
BH CV ECHO MEAS - LVOT AREA (M): 3.8 CM^2
BH CV ECHO MEAS - LVOT AREA: 3.8 CM^2
BH CV ECHO MEAS - LVOT DIAM: 2.2 CM
BH CV ECHO MEAS - LVPWD: 1.1 CM
BH CV ECHO MEAS - MED PEAK E' VEL: 8.7 CM/SEC
BH CV ECHO MEAS - MV A DUR: 0.14 SEC
BH CV ECHO MEAS - MV A MAX VEL: 67.4 CM/SEC
BH CV ECHO MEAS - MV DEC SLOPE: 330 CM/SEC^2
BH CV ECHO MEAS - MV DEC TIME: 199 SEC
BH CV ECHO MEAS - MV E MAX VEL: 96.5 CM/SEC
BH CV ECHO MEAS - MV E/A: 1.4
BH CV ECHO MEAS - MV MAX PG: 3.7 MMHG
BH CV ECHO MEAS - MV MEAN PG: 2 MMHG
BH CV ECHO MEAS - MV P1/2T MAX VEL: 95.4 CM/SEC
BH CV ECHO MEAS - MV P1/2T: 84.7 MSEC
BH CV ECHO MEAS - MV V2 MAX: 95.7 CM/SEC
BH CV ECHO MEAS - MV V2 MEAN: 56.1 CM/SEC
BH CV ECHO MEAS - MV V2 VTI: 28.9 CM
BH CV ECHO MEAS - MVA P1/2T LCG: 2.3 CM^2
BH CV ECHO MEAS - MVA(P1/2T): 2.6 CM^2
BH CV ECHO MEAS - MVA(VTI): 2.7 CM^2
BH CV ECHO MEAS - PA ACC TIME: 0.11 SEC
BH CV ECHO MEAS - PA MAX PG (FULL): 2.6 MMHG
BH CV ECHO MEAS - PA MAX PG: 4.3 MMHG
BH CV ECHO MEAS - PA PR(ACCEL): 30.4 MMHG
BH CV ECHO MEAS - PA V2 MAX: 104 CM/SEC
BH CV ECHO MEAS - PVA(V,A): 4.7 CM^2
BH CV ECHO MEAS - PVA(V,D): 4.7 CM^2
BH CV ECHO MEAS - QP/QS: 1.3
BH CV ECHO MEAS - RAP SYSTOLE: 3 MMHG
BH CV ECHO MEAS - RV MAX PG: 1.7 MMHG
BH CV ECHO MEAS - RV MEAN PG: 1 MMHG
BH CV ECHO MEAS - RV V1 MAX: 65 CM/SEC
BH CV ECHO MEAS - RV V1 MEAN: 37.3 CM/SEC
BH CV ECHO MEAS - RV V1 VTI: 13.5 CM
BH CV ECHO MEAS - RVOT AREA: 7.5 CM^2
BH CV ECHO MEAS - RVOT DIAM: 3.1 CM
BH CV ECHO MEAS - SI(AO): 119.4 ML/M^2
BH CV ECHO MEAS - SI(CUBED): 25.5 ML/M^2
BH CV ECHO MEAS - SI(LVOT): 31.1 ML/M^2
BH CV ECHO MEAS - SI(MOD-SP2): 24 ML/M^2
BH CV ECHO MEAS - SI(MOD-SP4): 32.3 ML/M^2
BH CV ECHO MEAS - SI(TEICH): 21.2 ML/M^2
BH CV ECHO MEAS - SV(AO): 299.1 ML
BH CV ECHO MEAS - SV(CUBED): 63.9 ML
BH CV ECHO MEAS - SV(LVOT): 77.9 ML
BH CV ECHO MEAS - SV(MOD-SP2): 60 ML
BH CV ECHO MEAS - SV(MOD-SP4): 81 ML
BH CV ECHO MEAS - SV(RVOT): 101.9 ML
BH CV ECHO MEAS - SV(TEICH): 53.1 ML
BH CV ECHO MEAS - TAPSE (>1.6): 2.5 CM2
BH CV ECHO MEASUREMENTS AVERAGE E/E' RATIO: 9.9
BH CV VAS BP RIGHT ARM: NORMAL MMHG
BH CV XLRA - RV BASE: 3.27 CM
BH CV XLRA - TDI S': 16 CM/SEC
BILIRUB SERPL-MCNC: 0.4 MG/DL (ref 0.2–1.2)
BUN BLD-MCNC: 7 MG/DL (ref 6–20)
BUN/CREAT SERPL: 12.7 (ref 7–25)
CALCIUM SPEC-SCNC: 9 MG/DL (ref 8.6–10.5)
CHLORIDE SERPL-SCNC: 99 MMOL/L (ref 98–107)
CO2 SERPL-SCNC: 20.5 MMOL/L (ref 22–29)
CREAT BLD-MCNC: 0.55 MG/DL (ref 0.76–1.27)
DEPRECATED RDW RBC AUTO: 50.4 FL (ref 37–54)
EOSINOPHIL # BLD AUTO: 0.39 10*3/MM3 (ref 0–0.4)
EOSINOPHIL NFR BLD AUTO: 3.6 % (ref 0.3–6.2)
ERYTHROCYTE [DISTWIDTH] IN BLOOD BY AUTOMATED COUNT: 13.2 % (ref 12.3–15.4)
FOLATE SERPL-MCNC: 3.39 NG/ML (ref 4.78–24.2)
GFR SERPL CREATININE-BSD FRML MDRD: >150 ML/MIN/1.73
GLOBULIN UR ELPH-MCNC: 3.5 GM/DL
GLUCOSE BLD-MCNC: 119 MG/DL (ref 65–99)
GLUCOSE BLDC GLUCOMTR-MCNC: 115 MG/DL (ref 70–130)
GLUCOSE BLDC GLUCOMTR-MCNC: 138 MG/DL (ref 70–130)
GLUCOSE BLDC GLUCOMTR-MCNC: 175 MG/DL (ref 70–130)
GLUCOSE BLDC GLUCOMTR-MCNC: 226 MG/DL (ref 70–130)
HCT VFR BLD AUTO: 38.4 % (ref 37.5–51)
HGB BLD-MCNC: 12.7 G/DL (ref 13–17.7)
IMM GRANULOCYTES # BLD AUTO: 0.04 10*3/MM3 (ref 0–0.05)
IMM GRANULOCYTES NFR BLD AUTO: 0.4 % (ref 0–0.5)
LEFT ATRIUM VOLUME INDEX: 27 ML/M2
LYMPHOCYTES # BLD AUTO: 1.74 10*3/MM3 (ref 0.7–3.1)
LYMPHOCYTES NFR BLD AUTO: 15.9 % (ref 19.6–45.3)
MAGNESIUM SERPL-MCNC: 1.9 MG/DL (ref 1.6–2.6)
MAXIMAL PREDICTED HEART RATE: 162 BPM
MCH RBC QN AUTO: 34.2 PG (ref 26.6–33)
MCHC RBC AUTO-ENTMCNC: 33.1 G/DL (ref 31.5–35.7)
MCV RBC AUTO: 103.5 FL (ref 79–97)
MONOCYTES # BLD AUTO: 1.27 10*3/MM3 (ref 0.1–0.9)
MONOCYTES NFR BLD AUTO: 11.6 % (ref 5–12)
NEUTROPHILS # BLD AUTO: 7.44 10*3/MM3 (ref 1.7–7)
NEUTROPHILS NFR BLD AUTO: 68.1 % (ref 42.7–76)
NRBC BLD AUTO-RTO: 0 /100 WBC (ref 0–0.2)
PLATELET # BLD AUTO: 298 10*3/MM3 (ref 140–450)
PMV BLD AUTO: 10.1 FL (ref 6–12)
POTASSIUM BLD-SCNC: 3.7 MMOL/L (ref 3.5–5.2)
PROT SERPL-MCNC: 7 G/DL (ref 6–8.5)
RBC # BLD AUTO: 3.71 10*6/MM3 (ref 4.14–5.8)
SODIUM BLD-SCNC: 136 MMOL/L (ref 136–145)
STRESS TARGET HR: 138 BPM
VIT B12 BLD-MCNC: 885 PG/ML (ref 211–946)
WBC NRBC COR # BLD: 10.92 10*3/MM3 (ref 3.4–10.8)

## 2019-08-13 PROCEDURE — 25010000002 AZITHROMYCIN PER 500 MG: Performed by: HOSPITALIST

## 2019-08-13 PROCEDURE — 94799 UNLISTED PULMONARY SVC/PX: CPT

## 2019-08-13 PROCEDURE — 25010000002 MORPHINE PER 10 MG: Performed by: INTERNAL MEDICINE

## 2019-08-13 PROCEDURE — 99222 1ST HOSP IP/OBS MODERATE 55: CPT | Performed by: INTERNAL MEDICINE

## 2019-08-13 PROCEDURE — 82607 VITAMIN B-12: CPT | Performed by: HOSPITALIST

## 2019-08-13 PROCEDURE — 82962 GLUCOSE BLOOD TEST: CPT

## 2019-08-13 PROCEDURE — 93306 TTE W/DOPPLER COMPLETE: CPT

## 2019-08-13 PROCEDURE — 82746 ASSAY OF FOLIC ACID SERUM: CPT | Performed by: HOSPITALIST

## 2019-08-13 PROCEDURE — 63710000001 INSULIN LISPRO (HUMAN) PER 5 UNITS: Performed by: INTERNAL MEDICINE

## 2019-08-13 PROCEDURE — 25010000002 CEFTRIAXONE PER 250 MG: Performed by: INTERNAL MEDICINE

## 2019-08-13 PROCEDURE — 93010 ELECTROCARDIOGRAM REPORT: CPT | Performed by: INTERNAL MEDICINE

## 2019-08-13 PROCEDURE — 85025 COMPLETE CBC W/AUTO DIFF WBC: CPT | Performed by: HOSPITALIST

## 2019-08-13 PROCEDURE — 80053 COMPREHEN METABOLIC PANEL: CPT | Performed by: HOSPITALIST

## 2019-08-13 PROCEDURE — 93306 TTE W/DOPPLER COMPLETE: CPT | Performed by: INTERNAL MEDICINE

## 2019-08-13 PROCEDURE — 93005 ELECTROCARDIOGRAM TRACING: CPT | Performed by: NURSE PRACTITIONER

## 2019-08-13 PROCEDURE — 94660 CPAP INITIATION&MGMT: CPT

## 2019-08-13 PROCEDURE — 83735 ASSAY OF MAGNESIUM: CPT | Performed by: NURSE PRACTITIONER

## 2019-08-13 RX ORDER — FOLIC ACID 1 MG/1
1 TABLET ORAL DAILY
Status: DISCONTINUED | OUTPATIENT
Start: 2019-08-13 | End: 2019-08-18 | Stop reason: HOSPADM

## 2019-08-13 RX ADMIN — SODIUM CHLORIDE, PRESERVATIVE FREE 3 ML: 5 INJECTION INTRAVENOUS at 21:06

## 2019-08-13 RX ADMIN — INSULIN LISPRO 4 UNITS: 100 INJECTION, SOLUTION INTRAVENOUS; SUBCUTANEOUS at 21:04

## 2019-08-13 RX ADMIN — AZITHROMYCIN 500 MG: 500 INJECTION, POWDER, LYOPHILIZED, FOR SOLUTION INTRAVENOUS at 18:12

## 2019-08-13 RX ADMIN — CLOTRIMAZOLE AND BETAMETHASONE DIPROPIONATE 1 APPLICATION: 10; .5 CREAM TOPICAL at 08:34

## 2019-08-13 RX ADMIN — MORPHINE SULFATE 4 MG: 2 INJECTION, SOLUTION INTRAMUSCULAR; INTRAVENOUS at 15:33

## 2019-08-13 RX ADMIN — INSULIN LISPRO 2 UNITS: 100 INJECTION, SOLUTION INTRAVENOUS; SUBCUTANEOUS at 11:19

## 2019-08-13 RX ADMIN — SODIUM CHLORIDE, PRESERVATIVE FREE 3 ML: 5 INJECTION INTRAVENOUS at 08:33

## 2019-08-13 RX ADMIN — MORPHINE SULFATE 4 MG: 2 INJECTION, SOLUTION INTRAMUSCULAR; INTRAVENOUS at 08:40

## 2019-08-13 RX ADMIN — SODIUM CHLORIDE 75 ML/HR: 9 INJECTION, SOLUTION INTRAVENOUS at 05:11

## 2019-08-13 RX ADMIN — MORPHINE SULFATE 4 MG: 2 INJECTION, SOLUTION INTRAMUSCULAR; INTRAVENOUS at 03:47

## 2019-08-13 RX ADMIN — ASPIRIN 81 MG: 81 TABLET, COATED ORAL at 08:32

## 2019-08-13 RX ADMIN — BUDESONIDE AND FORMOTEROL FUMARATE DIHYDRATE 2 PUFF: 80; 4.5 AEROSOL RESPIRATORY (INHALATION) at 20:28

## 2019-08-13 RX ADMIN — BUDESONIDE AND FORMOTEROL FUMARATE DIHYDRATE 2 PUFF: 80; 4.5 AEROSOL RESPIRATORY (INHALATION) at 11:51

## 2019-08-13 RX ADMIN — FOLIC ACID 1 MG: 1 TABLET ORAL at 21:04

## 2019-08-13 RX ADMIN — CLOTRIMAZOLE AND BETAMETHASONE DIPROPIONATE: 10; .5 CREAM TOPICAL at 21:05

## 2019-08-13 RX ADMIN — MORPHINE SULFATE 4 MG: 2 INJECTION, SOLUTION INTRAMUSCULAR; INTRAVENOUS at 21:10

## 2019-08-13 RX ADMIN — CEFTRIAXONE SODIUM 1 G: 1 INJECTION, SOLUTION INTRAVENOUS at 21:04

## 2019-08-13 NOTE — CONSULTS
[facility]    Heather Peraza APRN    CHIEF COMPLAINT: Chest pain shortness of breath    HISTORY OF PRESENT ILLNESS:    Kd is a 59-year-old male patient who reportedly sees Dr. Kat at [facility] cardiology [facility] [place].  He has multiple coronary artery risk factors including diabetes mellitus type II, dyslipidemia, hypertension and tobacco use.  He was admitted to [facility] for complaints of chest pain and shortness of breath.    We were consulted for his chest pain complaint and his reported cardiac history.  Of note his chest pain syndrome consists of inspiratory substernal chest pain rated at 5 out of 10 that radiates to his flanks is not worsened with exertion but is neither improved with lying flat or sitting upright or with rest.  It is been relatively chronic but again worsens with respiration.  He denies any associated fever or chills.  His dyspnea is chronic at rest and does worsen with exertion.  The above his evolved over the past 2 weeks.  He did have an elevated temperature of almost 100 during his hospital stay.  In addition he has a leukocytosis peaking at 19 and currently resolving to 11 today.  In the setting he was placed on ceftriaxone for what appears to be a urinary tract infection by urinalysis but not by clinical syndrome.  Of note I personally reviewed his chest x-ray from 8/11/2019 which shows small bilateral pleural effusions but no acute pulmonary process and no pulmonary edema.  I personally reviewed his ECG from this hospitalization which shows normal sinus rhythm with inferior T wave flattening otherwise nonspecific ST-T wave changes.  In addition I reviewed his echocardiogram images from 8/13/2018 which showed normal LV systolic function, normal RV systolic function, no significant valvular heart disease and no evidence of pericardial effusion.  So had no evidence of elevated right-sided filling pressures.    He underwent  cycling of his cardiac enzymes which showed negative troponins across the board.      Past Medical History:   Diagnosis Date   • Arthritis    • Asthma    • Concussion    • Coronary artery disease    • Diabetes mellitus (CMS/HCC)    • Diverticulitis    • Elevated cholesterol    • GERD (gastroesophageal reflux disease)    • Hyperlipidemia    • Hypertension    • Kidney stone    • Migraine    • TBI (traumatic brain injury) (CMS/HCC)    • Torn meniscus      Past Surgical History:   Procedure Laterality Date   • ABDOMINAL SURGERY     • CARDIAC CATHETERIZATION     • COLON RESECTION     • COLON SURGERY     • COLONOSCOPY     • ENDOSCOPY     • HERNIA REPAIR     • SKIN BIOPSY       Family History   Problem Relation Age of Onset   • Alcohol abuse Mother    • Arthritis Mother    • Asthma Mother    • Cancer Mother    • Hearing loss Mother    • Alcohol abuse Father    • Arthritis Father    • Diabetes Father    • Heart disease Father    • Hyperlipidemia Father    • Hypertension Father      Social History     Tobacco Use   • Smoking status: Former Smoker     Packs/day: 0.50     Years: 15.00     Pack years: 7.50     Types: Cigarettes     Start date:      Last attempt to quit:      Years since quittin.6   • Smokeless tobacco: Never Used   Substance Use Topics   • Alcohol use: Yes     Alcohol/week: 0.6 - 1.8 oz     Types: 1 - 3 Glasses of wine per week     Comment: DRINKS 3-4X'S A WEEK   • Drug use: No     Medications Prior to Admission   Medication Sig Dispense Refill Last Dose   • ALPRAZolam (XANAX) 0.5 MG tablet Take 0.5 mg by mouth 2 (Two) Times a Day As Needed for Anxiety.   Patient Taking Differently at Unknown time   • amLODIPine-benazepril (LOTREL) 10-20 MG per capsule Take 1 capsule by mouth Daily.      • budesonide-formoterol (SYMBICORT) 80-4.5 MCG/ACT inhaler Inhale 2 puffs Daily.      • cetirizine (zyrTEC) 10 MG tablet Take 10 mg by mouth Daily As Needed.   Patient Taking Differently at Unknown time   •  "esomeprazole (nexIUM) 40 MG capsule Take 40 mg by mouth Every Morning Before Breakfast.      • fluticasone-salmeterol (ADVAIR HFA) 230-21 MCG/ACT inhaler Inhale 2 puffs Daily.      • gabapentin (NEURONTIN) 300 MG capsule Take 300 mg by mouth 3 (Three) Times a Day.      • sertraline (ZOLOFT) 100 MG tablet Take 100 mg by mouth Daily.      • topiramate (TOPAMAX) 50 MG tablet Take 50 mg by mouth Daily.        Allergies:  Patient has no known allergies.    Review of Symptoms:  Constitutional: Patient afebrile no chills or unexpected weight changes  Respiratory: No cough, no wheezing but complains of dyspnea  Cardiovascular: Complains of chest pain, no palpitations, but also complaining of dyspnea, without orthopnea and no edema  Gastrointestinal: No nausea, vomiting, constipation or diarrhea.  No melena or dark stools    All other systems reviewed and are negative         Vital Signs  Temp:  [97.7 °F (36.5 °C)-98.3 °F (36.8 °C)] 98.3 °F (36.8 °C)  Heart Rate:  [58-93] 58  Resp:  [18] 18  BP: (116-133)/(69-88) 131/88  Oxygen Therapy  SpO2: 95 %  Pulse Oximetry Type: Continuous  Device (Oxygen Therapy): nasal cannula  Flow (L/min): 2}  Body mass index is 34.17 kg/m².  Flowsheet Rows      First Filed Value   Admission Height  182.9 cm (72\") Documented at 08/11/2019 1726   Admission Weight  120 kg (264 lb) Documented at 08/11/2019 1726           Physical exam  Constitutional: well-nourished, and appears stated age in no acute distress  PERRL: Conjunctiva clear, no pallor, anicteric  HENMT: normocephalic, normal dentition, no cyanosis or pallor  Neck:no bruits, or thrills and bilateral normal carotid upstroke. Normal jugular venous pressure  Cardiovascular: No parasternal heaves an non-displaced focal PMI. Normal rate and rhythm: no rub, gallop, murmur or click and normal S1 and S2; no lower or upper extremity edema.   Lungs: unlabored, anterior and posterior bilateral expiratory wheezing with decreased breath sounds at the " bilateral lower lung fields and base right greater than left without rhonchi on auscultation.  Extremities: Warm, no clubbing, cyanosis, or edema. Full and equal peripheral pulses in extremities with no bruits appreciated.   Abdomen: soft, non-tender, non-distended  Musculoskeletal: no joint tenderness or swelling and no erythema  Skin: Warm and dry, non-erythematous   Neuro:alert and normal affect. Oriented to time, place and person.       Results Review:    I reviewed the patient's new clinical results.  Lab Results (most recent)     Procedure Component Value Units Date/Time    POC Glucose Once [[id number]]  (Abnormal) Collected:  08/13/19 1052    Specimen:  Blood Updated:  08/13/19 1058     Glucose 175 mg/dL     POC Glucose Once [[id number]]  (Normal) Collected:  08/13/19 0643    Specimen:  Blood Updated:  08/13/19 0646     Glucose 115 mg/dL     Folate [[id number]]  (Abnormal) Collected:  08/13/19 0514    Specimen:  Blood from Arm, Right Updated:  08/13/19 0631     Folate 3.39 ng/mL     Vitamin B12 [[id number]]  (Normal) Collected:  08/13/19 0514    Specimen:  Blood from Arm, Right Updated:  08/13/19 0631     Vitamin B-12 885 pg/mL     Comprehensive Metabolic Panel [[id number]]  (Abnormal) Collected:  08/13/19 0514    Specimen:  Blood from Arm, Right Updated:  08/13/19 0630     Glucose 119 mg/dL      BUN 7 mg/dL      Creatinine 0.55 mg/dL      Sodium 136 mmol/L      Potassium 3.7 mmol/L      Chloride 99 mmol/L      CO2 20.5 mmol/L      Calcium 9.0 mg/dL      Total Protein 7.0 g/dL      Albumin 3.50 g/dL      ALT (SGPT) 6 U/L      AST (SGOT) 11 U/L      Alkaline Phosphatase 58 U/L      Total Bilirubin 0.4 mg/dL      eGFR  African Amer >150 mL/min/1.73      Globulin 3.5 gm/dL      A/G Ratio 1.0 g/dL      BUN/Creatinine Ratio 12.7     Anion Gap 16.5 mmol/L     Narrative:       GFR Normal >60  Chronic Kidney Disease <60  Kidney Failure <15    Magnesium [[id number]]  (Normal) Collected:  08/13/19 0514    Specimen:  Blood  from Arm, Right Updated:  08/13/19 0630     Magnesium 1.9 mg/dL     CBC & Differential [[id number]] Collected:  08/13/19 0514    Specimen:  Blood Updated:  08/13/19 0557    Narrative:       The following orders were created for panel order CBC & Differential.  Procedure                               Abnormality         Status                     ---------                               -----------         ------                     CBC Auto Differential[[id number]]        Abnormal            Final result                 Please view results for these tests on the individual orders.    CBC Auto Differential [[id number]]  (Abnormal) Collected:  08/13/19 0514    Specimen:  Blood from Arm, Right Updated:  08/13/19 0557     WBC 10.92 10*3/mm3      RBC 3.71 10*6/mm3      Hemoglobin 12.7 g/dL      Hematocrit 38.4 %      .5 fL      MCH 34.2 pg      MCHC 33.1 g/dL      RDW 13.2 %      RDW-SD 50.4 fl      MPV 10.1 fL      Platelets 298 10*3/mm3      Neutrophil % 68.1 %      Lymphocyte % 15.9 %      Monocyte % 11.6 %      Eosinophil % 3.6 %      Basophil % 0.4 %      Immature Grans % 0.4 %      Neutrophils, Absolute 7.44 10*3/mm3      Lymphocytes, Absolute 1.74 10*3/mm3      Monocytes, Absolute 1.27 10*3/mm3      Eosinophils, Absolute 0.39 10*3/mm3      Basophils, Absolute 0.04 10*3/mm3      Immature Grans, Absolute 0.04 10*3/mm3      nRBC 0.0 /100 WBC     Urine Culture - Urine, Urine, Clean Catch [[id number]] Collected:  08/11/19 1803    Specimen:  Urine, Clean Catch Updated:  08/12/19 1920     Urine Culture >100,000 CFU/mL Mixed Joon Isolated    Narrative:       Specimen contains mixed organisms of questionable pathogenicity which indicates contamination with commensal joon.  Further identification is unlikely to provide clinically useful information.  Suggest recollection.    Sedimentation Rate [[id number]]  (Abnormal) Collected:  08/12/19 1023    Specimen:  Blood Updated:  08/12/19 1129     Sed Rate 56 mm/hr     Troponin  [[id number]]  (Normal) Collected:  08/12/19 1023    Specimen:  Blood Updated:  08/12/19 1125     Troponin T <0.010 ng/mL     Narrative:       Troponin T Reference Range:  <= 0.03 ng/mL-   Negative for AMI  >0.03 ng/mL-     Abnormal for myocardial necrosis.  Clinicians would have to utilize clinical acumen, EKG, Troponin and serial changes to determine if it is an Acute Myocardial Infarction or myocardial injury due to an underlying chronic condition.     Magnesium [[id number]]  (Normal) Collected:  08/12/19 0421    Specimen:  Blood Updated:  08/12/19 0817     Magnesium 1.7 mg/dL     C-reactive Protein [[id number]]  (Abnormal) Collected:  08/12/19 0421    Specimen:  Blood Updated:  08/12/19 0817     C-Reactive Protein 19.86 mg/dL     Hemoglobin A1c [[id number]]  (Abnormal) Collected:  08/12/19 0421    Specimen:  Blood Updated:  08/12/19 0754     Hemoglobin A1C 6.00 %     Narrative:       Hemoglobin A1C Ranges:    Increased Risk for Diabetes  5.7% to 6.4%  Diabetes                     >= 6.5%  Diabetic Goal                < 7.0%    Comprehensive Metabolic Panel [[id number]]  (Abnormal) Collected:  08/12/19 0421    Specimen:  Blood Updated:  08/12/19 0506     Glucose 165 mg/dL      BUN 9 mg/dL      Creatinine 0.56 mg/dL      Sodium 137 mmol/L      Potassium 3.3 mmol/L      Chloride 99 mmol/L      CO2 23.7 mmol/L      Calcium 8.6 mg/dL      Total Protein 6.9 g/dL      Albumin 3.80 g/dL      ALT (SGPT) 6 U/L      AST (SGOT) 10 U/L      Alkaline Phosphatase 61 U/L      Total Bilirubin 0.5 mg/dL      eGFR  African Amer >150 mL/min/1.73      Globulin 3.1 gm/dL      A/G Ratio 1.2 g/dL      BUN/Creatinine Ratio 16.1     Anion Gap 14.3 mmol/L     Narrative:       GFR Normal >60  Chronic Kidney Disease <60  Kidney Failure <15    Troponin [[id number]]  (Normal) Collected:  08/12/19 0421    Specimen:  Blood Updated:  08/12/19 0506     Troponin T <0.010 ng/mL     Narrative:       Troponin T Reference Range:  <= 0.03 ng/mL-   Negative  for AMI  >0.03 ng/mL-     Abnormal for myocardial necrosis.  Clinicians would have to utilize clinical acumen, EKG, Troponin and serial changes to determine if it is an Acute Myocardial Infarction or myocardial injury due to an underlying chronic condition.     CBC Auto Differential [[id number]]  (Abnormal) Collected:  08/12/19 0421    Specimen:  Blood Updated:  08/12/19 0441     WBC 15.09 10*3/mm3      RBC 3.76 10*6/mm3      Hemoglobin 12.8 g/dL      Hematocrit 38.1 %      .3 fL      MCH 34.0 pg      MCHC 33.6 g/dL      RDW 13.2 %      RDW-SD 49.6 fl      MPV 9.6 fL      Platelets 308 10*3/mm3      Neutrophil % 75.0 %      Lymphocyte % 10.3 %      Monocyte % 13.3 %      Eosinophil % 0.6 %      Basophil % 0.3 %      Immature Grans % 0.5 %      Neutrophils, Absolute 11.33 10*3/mm3      Lymphocytes, Absolute 1.55 10*3/mm3      Monocytes, Absolute 2.00 10*3/mm3      Eosinophils, Absolute 0.09 10*3/mm3      Basophils, Absolute 0.04 10*3/mm3      Immature Grans, Absolute 0.08 10*3/mm3      nRBC 0.0 /100 WBC     Urinalysis, Microscopic Only - Urine, Clean Catch [[id number]]  (Abnormal) Collected:  08/11/19 1803    Specimen:  Urine, Clean Catch Updated:  08/11/19 1852     RBC, UA 21-30 /HPF      WBC, UA 21-30 /HPF      Bacteria, UA None Seen /HPF      Squamous Epithelial Cells, UA 3-6 /HPF      Hyaline Casts, UA 13-20 /LPF      Sperm, UA Moderate/2+ /HPF      Methodology Manual Light Microscopy    Urine Drug Screen - Urine, Clean Catch [[id number]]  (Abnormal) Collected:  08/11/19 1803    Specimen:  Urine, Clean Catch Updated:  08/11/19 1838     Amphet/Methamphet, Screen Negative     Barbiturates Screen, Urine Negative     Benzodiazepine Screen, Urine Negative     Cocaine Screen, Urine Negative     Opiate Screen Positive     THC, Screen, Urine Negative     Methadone Screen, Urine Negative     Oxycodone Screen, Urine Negative    Narrative:       Negative Thresholds For Drugs Screened:     Amphetamines               500  ng/ml   Barbiturates               200 ng/ml   Benzodiazepines            100 ng/ml   Cocaine                    300 ng/ml   Methadone                  300 ng/ml   Opiates                    300 ng/ml   Oxycodone                  100 ng/ml   THC                        50 ng/ml    The Normal Value for all drugs tested is negative. This report includes final unconfirmed screening results to be used for medical treatment purposes only. Unconfirmed results must not be used for non-medical purposes such as employment or legal testing. Clinical consideration should be applied to any drug of abuse test, particulary when unconfirmed results are used.    Urinalysis With Microscopic If Indicated (No Culture) - Urine, Clean Catch [[id number]]  (Abnormal) Collected:  08/11/19 1803    Specimen:  Urine, Clean Catch Updated:  08/11/19 1835     Color, UA Dark Yellow     Appearance, UA Cloudy     pH, UA 6.0     Specific Gravity, UA >=1.030     Glucose, UA >=1000 mg/dL (3+)     Ketones, UA 15 mg/dL (1+)     Bilirubin, UA Negative     Blood, UA Moderate (2+)     Protein, UA Trace     Leuk Esterase, UA Moderate (2+)     Nitrite, UA Negative     Urobilinogen, UA 2.0 E.U./dL    [facility] Draw [[id number]] Collected:  08/11/19 1730    Specimen:  Blood Updated:  08/11/19 1831    Narrative:       The following orders were created for panel order [facility] Draw.  Procedure                               Abnormality         Status                     ---------                               -----------         ------                     Light Blue Top[[id number]]                                   Final result               Green Top (Gel)[[id number]]                                  Final result               Lavender Top[[id number]]                                     Final result               Gold Top - SST[[id number]]                                   Final result                 Please view results for these tests on the individual orders.    Light  Blue Top [[id number]] Collected:  08/11/19 1730    Specimen:  Blood Updated:  08/11/19 1831     Extra Tube hold for add-on     Comment: Auto resulted       Green Top (Gel) [[id number]] Collected:  08/11/19 1730    Specimen:  Blood Updated:  08/11/19 1831     Extra Tube Hold for add-ons.     Comment: Auto resulted.       Lavender Top [[id number]] Collected:  08/11/19 1730    Specimen:  Blood Updated:  08/11/19 1831     Extra Tube hold for add-on     Comment: Auto resulted       Gold Top - SST [[id number]] Collected:  08/11/19 1730    Specimen:  Blood Updated:  08/11/19 1831     Extra Tube Hold for add-ons.     Comment: Auto resulted.       Lipase [[id number]]  (Normal) Collected:  08/11/19 1730    Specimen:  Blood Updated:  08/11/19 1809     Lipase 13 U/L     CBC & Differential [[id number]] Collected:  08/11/19 1730    Specimen:  Blood Updated:  08/11/19 1801    Narrative:       The following orders were created for panel order CBC & Differential.  Procedure                               Abnormality         Status                     ---------                               -----------         ------                     CBC Auto Differential[[id number]]        Abnormal            Final result                 Please view results for these tests on the individual orders.    Protime-INR [[id number]]  (Normal) Collected:  08/11/19 1730    Specimen:  Blood Updated:  08/11/19 1757     Protime 13.9 Seconds      INR 1.10    D-dimer, Quantitative [[id number]]  (Abnormal) Collected:  08/11/19 1730    Specimen:  Blood Updated:  08/11/19 1757     D-Dimer, Quantitative 0.63 MCGFEU/mL     Narrative:       The Stago D-Dimer test used in conjunction with a clinical pretest probability (PTP) assessment model, has been approved by the FDA to rule out the presence of venous thromboembolism (VTE) in outpatients suspected of deep venous thrombosis (DVT) or pulmonary embolism (PE). The cut-off for negative predictive value is <0.50 MCGFEU/mL.           Imaging Results (most recent)     Procedure Component Value Units Date/Time    CT Angiogram Chest With Contrast [[id number]] Collected:  08/11/19 1911     Updated:  08/11/19 1926    Narrative:       CT ANGIOGRAM THORAX WITH CONTRAST, PULMONARY EMBOLISM PROTOCOL     HISTORY: Pulmonary embolism.     COMPARISON: None.     TECHNIQUE: Radiation dose reduction techniques were utilized, including  automated exposure control and exposure modulation based on body size.  Axial contrast-enhanced images of the chest were obtained according to  the pulmonary embolism protocol. Coronal oblique 3-D MIP reformatted  images were supplemented and reviewed.  100 mls of non ionic contrast  was utilized intravenously.     FINDINGS CHEST CT: Minimal bullous and fibrous changes are noted in the  lung apices, right greater than left. Dependent densities in the lower  lobes, right greater than left favored to be related to atelectasis  rather than pneumonia. There is no convincing evidence of active air  space disease process otherwise. IV contrast bolus is less than optimal  for pulmonary embolism assessment. There is no central or large proximal  embolus. There are no secondary signs of PE in the parenchyma. Aorta  mildly ectatic but nonaneurysmal. Mild cardiomegaly.             Impression:       1. Dependent lower lobe opacities are favored to be related to areas of  atelectasis, no active airspace disease.  2. Suboptimal IV contrast bolus but no central or large proximal PE  demonstrated.                    CT SCANS ABDOMEN AND PELVIS WITH IV CONTRAST.     HISTORY: eval for PE     COMPARISON: None.     TECHNIQUE: Radiation dose reduction techniques were utilized, including  automated exposure control and exposure modulation based on body size.  Axial images were obtained from the lung bases to the symphysis pubis  with IV contrast only.. Oral contrast was not administered per request.     FINDINGS :  There are a couple of  subcentimeter renal lesions  bilaterally. The lesions are too small for accurate or detailed  assessment. Small cysts are favored, particularly if there is no history  of malignancy.  Suggest follow-up to ensure stability. Remaining solid  organs have an unremarkable appearance. Normal aorta and appendix. Mild  diverticulosis. Mild constipation. The GI tract not opacified for  assessment but non obstructive in appearance. There is a fat-containing  ventral hernia to the left of midline without associated inflammatory  change.           IMPRESSION :   1. Tiny renal lesions as discussed.  2. Constipation, diverticulosis, no obstruction or acute inflammation        This report was finalized on 8/11/2019 7:22 PM by Christian Hutson M.D.       CT Abdomen Pelvis With Contrast [[id number]] Collected:  08/11/19 1911     Updated:  08/11/19 1926    Narrative:       CT ANGIOGRAM THORAX WITH CONTRAST, PULMONARY EMBOLISM PROTOCOL     HISTORY: Pulmonary embolism.     COMPARISON: None.     TECHNIQUE: Radiation dose reduction techniques were utilized, including  automated exposure control and exposure modulation based on body size.  Axial contrast-enhanced images of the chest were obtained according to  the pulmonary embolism protocol. Coronal oblique 3-D MIP reformatted  images were supplemented and reviewed.  100 mls of non ionic contrast  was utilized intravenously.     FINDINGS CHEST CT: Minimal bullous and fibrous changes are noted in the  lung apices, right greater than left. Dependent densities in the lower  lobes, right greater than left favored to be related to atelectasis  rather than pneumonia. There is no convincing evidence of active air  space disease process otherwise. IV contrast bolus is less than optimal  for pulmonary embolism assessment. There is no central or large proximal  embolus. There are no secondary signs of PE in the parenchyma. Aorta  mildly ectatic but nonaneurysmal. Mild cardiomegaly.              Impression:       1. Dependent lower lobe opacities are favored to be related to areas of  atelectasis, no active airspace disease.  2. Suboptimal IV contrast bolus but no central or large proximal PE  demonstrated.                    CT SCANS ABDOMEN AND PELVIS WITH IV CONTRAST.     HISTORY: eval for PE     COMPARISON: None.     TECHNIQUE: Radiation dose reduction techniques were utilized, including  automated exposure control and exposure modulation based on body size.  Axial images were obtained from the lung bases to the symphysis pubis  with IV contrast only.. Oral contrast was not administered per request.     FINDINGS :  There are a couple of subcentimeter renal lesions  bilaterally. The lesions are too small for accurate or detailed  assessment. Small cysts are favored, particularly if there is no history  of malignancy.  Suggest follow-up to ensure stability. Remaining solid  organs have an unremarkable appearance. Normal aorta and appendix. Mild  diverticulosis. Mild constipation. The GI tract not opacified for  assessment but non obstructive in appearance. There is a fat-containing  ventral hernia to the left of midline without associated inflammatory  change.           IMPRESSION :   1. Tiny renal lesions as discussed.  2. Constipation, diverticulosis, no obstruction or acute inflammation        This report was finalized on 8/11/2019 7:22 PM by Christian Hutson M.D.       XR Chest 2 View [[id number]] Collected:  08/11/19 1804     Updated:  08/11/19 1809    Narrative:       PA AND LATERAL CHEST     CLINICAL HISTORY: Chest pain     The lungs are fairly well-expanded and appear free of focal infiltrates  or masses. There is no pneumothorax. Tiny bilateral pleural effusions  are evident. The heart is within normal limits in size. The pulmonary  vasculature is within normal limits.     IMPRESSIONS: Tiny bilateral pleural effusions. Otherwise unremarkable  chest x-ray.     This report was finalized on 8/11/2019  6:06 PM by Dr. Iván Tang M.D.           reviewed    ECG/EMG Results (most recent)     Procedure Component Value Units Date/Time    ECG 12 Lead [[id number]] Collected:  08/11/19 1723     Updated:  08/12/19 1136    Narrative:       HEART RATE= 101  bpm  RR Interval= 596  ms  NM Interval= 194  ms  P Horizontal Axis= 264  deg  P Front Axis= 60  deg  QRSD Interval= 80  ms  QT Interval= 332  ms  QRS Axis= 13  deg  T Wave Axis= 28  deg  - ABNORMAL ECG -  Sinus tachycardia  Ventricular premature complex  Mild inferior ST elevations- consider inferior injury versus pericarditis  NO PRIOR TRACING AVAILABLE FOR COMPARISON  Electronically Signed By: Suzy Ramirez ([facility]) 12-Aug-2019 11:36:27  Date and Time of Study: 2019-08-11 17:23:39    Adult Transthoracic Echo Complete W/ Cont if Necessary Per Protocol [[id number]] Collected:  08/13/19 0733     Updated:  08/13/19 0807     BSA 2.5 m^2      IVSd 1.1 cm      LVIDd 4.8 cm      LVIDs 3.6 cm      LVPWd 1.1 cm      IVS/LVPW 1.0     FS 25.0 %      EDV(Teich) 107.5 ml      ESV(Teich) 54.4 ml      EF(Teich) 49.4 %      EDV(cubed) 110.6 ml      ESV(cubed) 46.7 ml      EF(cubed) 57.8 %      LV mass(C)d 194.0 grams      LV mass(C)dI 77.4 grams/m^2      SV(Teich) 53.1 ml      SI(Teich) 21.2 ml/m^2      SV(cubed) 63.9 ml      SI(cubed) 25.5 ml/m^2      Ao root diam 4.0 cm      Ao root area 12.6 cm^2      ACS 2.2 cm      asc Aorta Diam 3.7 cm      LVOT diam 2.2 cm      LVOT area 3.8 cm^2      LVOT area(traced) 3.8 cm^2      RVOT diam 3.1 cm      RVOT area 7.5 cm^2      LVLd ap4 10.2 cm      EDV(MOD-sp4) 133.0 ml      LVLs ap4 8.8 cm      ESV(MOD-sp4) 52.0 ml      EF(MOD-sp4) 60.9 %      LVLd ap2 9.3 cm      EDV(MOD-sp2) 119.0 ml      LVLs ap2 8.1 cm      ESV(MOD-sp2) 59.0 ml      EF(MOD-sp2) 50.4 %      SV(MOD-sp4) 81.0 ml      SI(MOD-sp4) 32.3 ml/m^2      SV(MOD-sp2) 60.0 ml      SI(MOD-sp2) 24.0 ml/m^2      Ao root area (BSA corrected) 1.6     LV [place] Vol (BSA corrected) 53.1 ml/m^2       LV Sys Vol (BSA corrected) 20.8 ml/m^2      MV A dur 0.14 sec      MV E max lisa 96.5 cm/sec      MV A max lisa 67.4 cm/sec      MV E/A 1.4     MV V2 max 95.7 cm/sec      MV max PG 3.7 mmHg      MV V2 mean 56.1 cm/sec      MV mean PG 2.0 mmHg      MV V2 VTI 28.9 cm      MVA(VTI) 2.7 cm^2      MV P1/2t max lisa 95.4 cm/sec      MV P1/2t 84.7 msec      MVA(P1/2t) 2.6 cm^2      MV dec slope 330.0 cm/sec^2      MV dec time 199 sec      Ao pk lisa 109.0 cm/sec      Ao max PG 4.8 mmHg      Ao max PG (full) 1.1 mmHg      Ao V2 mean 72.8 cm/sec      Ao mean PG 3.0 mmHg      Ao mean PG (full) 1.0 mmHg      Ao V2 VTI 23.8 cm      MICH(I,A) 3.3 cm^2      MICH(I,D) 3.3 cm^2      MICH(V,A) 3.3 cm^2      MICH(V,D) 3.3 cm^2      AI max lisa 410.0 cm/sec      AI max PG 67.2 mmHg      AI dec slope 183.5 cm/sec^2      AI P1/2t 654.4 msec      LV V1 max PG 3.6 mmHg      LV V1 mean PG 2.0 mmHg      LV V1 max 95.3 cm/sec      LV V1 mean 62.5 cm/sec      LV V1 VTI 20.5 cm      SV(Ao) 299.1 ml      SI(Ao) 119.4 ml/m^2      SV(LVOT) 77.9 ml      SV(RVOT) 101.9 ml      SI(LVOT) 31.1 ml/m^2      PA V2 max 104.0 cm/sec      PA max PG 4.3 mmHg      PA max PG (full) 2.6 mmHg      BH CV ECHO GALILEA - PVA(V,A) 4.7 cm^2      BH CV ECHO GALILEA - PVA(V,D) 4.7 cm^2      PA acc time 0.11 sec      RV V1 max PG 1.7 mmHg      RV V1 mean PG 1.0 mmHg      RV V1 max 65.0 cm/sec      RV V1 mean 37.3 cm/sec      RV V1 VTI 13.5 cm      RAP systole 3.0 mmHg      PA pr(Accel) 30.4 mmHg      Qp/Qs 1.3     MVA P1/2T LCG 2.3 cm^2       CV ECHO GALILEA - BZI_BMI 34.1 kilograms/m^2       CV ECHO GALILEA - BSA(HAYCOCK) 2.6 m^2       CV ECHO GALILEA - BZI_METRIC_WEIGHT 123.8 kg       CV ECHO GALILEA - BZI_METRIC_HEIGHT 190.5 cm      Target HR (85%) 138 bpm      Max. Pred. HR (100%) 162 bpm       CV VAS BP RIGHT /69 mmHg      TDI S' 16.00 cm/sec      RV Base 3.27 cm      Dimensionless Index 0.9 (DI)      LA Volume Index 27.0 mL/m2      Avg E/e' ratio 9.90     EF(MOD-bp)  56.6 %      Lat Peak E' Gabe 10.8 cm/sec      Med Peak E' Gabe 8.70 cm/sec      TAPSE (>1.6) 2.50 cm2     ECG 12 Lead [[id number]] Collected:  08/13/19 0558     Updated:  08/13/19 0824    Narrative:       HEART RATE= 76  bpm  RR Interval= 792  ms  MN Interval= 222  ms  P Horizontal Axis= 11  deg  P Front Axis= 51  deg  QRSD Interval= 88  ms  QT Interval= 371  ms  QRS Axis= 51  deg  T Wave Axis= 5  deg  - ABNORMAL ECG -  Sinus rhythm  Prolonged MN interval  Low voltage, precordial leads  Non-specific STT wave change  ns st t changes new  Electron[facility]igned [facility]: [facility] ([facility] 13-Aug-2019 08:24:32  Date and Time of Study: 2019-08-13 05:58:13          .prob  reviewed    Assessment/Plan      Patient Active Problem List   Diagnosis   • Chest pain  Chest pain is not necessarily consistent with angina pectoris.  It does have some typical features.  However after personally reviewing his laboratory data as described in the HPI, his echocardiogram and his chest x-ray (CT scan also reviewed and showed no evidence of pulmonary emboli), the most likely clinical picture is that of mild pleuritis secondary to possible parapneumonic effusions bilaterally albeit radiographically atypical.  He is on ceftriaxone.  It may be useful to add azithromycin.  Significant improvement in his leukocytosis and has not had a fever since his hospitalization.   • TBI (traumatic brain injury) (CMS/[facility])   • Hypertension  Being well controlled on medical therapy while in house.   • UTI (urinary tract infection)   • Coronary artery disease  Currently nonobstructive as per previous cardiac catheterization by patient report.  Assume clinically silent at this time.  No evidence of acute coronary syndrome and negative biomarkers.do not believe that anticoagulation is necessary at this time.  May consider an out stress testing versus outpatient stress testing if the above does not resolve in the next 24 hours.   • Hyperlipidemia   • Balanitis    • Elevated MCV   • Hypokalemia   • Elevated d-dimer   • DM2 (diabetes mellitus, type 2) (CMS/HCC)  A cardiac risk factor but not likely contributing to his presentation.    Pneumonia  -Clinical diagnosis at this time without significant radiographic confirmation.  Would tailor antibiotics for community-acquired pneumonia.  Will hold off on further cardiac work-up at this time.     Greater than 30 minutes of face-to-face/floor time the patient, of which greater than 50% was spent counseling specifically discussing the unlikelihood of acute coronary syndrome owing to his current clinical syndrome.      I discussed the patients findings and my recommendations with patient.     Uriel Tirado MD  08/13/19  2:00 PM    Time: See above

## 2019-08-13 NOTE — PLAN OF CARE
Problem: Patient Care Overview  Goal: Plan of Care Review  Outcome: Ongoing (interventions implemented as appropriate)   08/13/19 0592   Coping/Psychosocial   Plan of Care Reviewed With patient   Plan of Care Review   Progress improving   OTHER   Outcome Summary vss. infrequent c/o HA. morphine given prn with positive results. iv abx. ivfs. will continue to monitor.,

## 2019-08-13 NOTE — PLAN OF CARE
Problem: Patient Care Overview  Goal: Plan of Care Review  Outcome: Ongoing (interventions implemented as appropriate)   08/13/19 0516 08/13/19 1410 08/13/19 1504   Coping/Psychosocial   Plan of Care Reviewed With --  patient --    Plan of Care Review   Progress improving --  --    OTHER   Outcome Summary --  --  VSS, infrequent complaints of pain, IVF, echo performed this morning, will continue to closely monitor.     Goal: Individualization and Mutuality  Outcome: Ongoing (interventions implemented as appropriate)    Goal: Discharge Needs Assessment  Outcome: Ongoing (interventions implemented as appropriate)    Goal: Interprofessional Rounds/Family Conf  Outcome: Ongoing (interventions implemented as appropriate)      Problem: Fall Risk (Adult)  Goal: Identify Related Risk Factors and Signs and Symptoms  Outcome: Ongoing (interventions implemented as appropriate)      Problem: Pain, Acute (Adult)  Goal: Identify Related Risk Factors and Signs and Symptoms  Outcome: Ongoing (interventions implemented as appropriate)    Goal: Acceptable Pain Control/Comfort Level  Outcome: Ongoing (interventions implemented as appropriate)      Problem: Skin Injury Risk (Adult)  Goal: Identify Related Risk Factors and Signs and Symptoms  Outcome: Ongoing (interventions implemented as appropriate)    Goal: Skin Health and Integrity  Outcome: Ongoing (interventions implemented as appropriate)

## 2019-08-13 NOTE — PROGRESS NOTES
"   LOS: 1 day   Patient Care Team:  Heather Peraza APRN as PCP - General (Family Medicine)    Chief Complaint: SOA    Subjective     Feeling better today. Does c/o some SOA. Chest pain is improved. Voiding well.        Subjective:  Symptoms:  Improved.  He reports shortness of breath and chest pain.  No malaise, cough, weakness, headache, chest pressure, anorexia, diarrhea or anxiety.    Diet:  Adequate intake.  No nausea or vomiting.    Activity level: Impaired due to pain.    Pain:  He complains of pain that is mild.  He reports pain is improving.  Pain is well controlled.        History taken from: patient chart RN    Objective     Vital Signs  Temp:  [97.7 °F (36.5 °C)-98.3 °F (36.8 °C)] 98.3 °F (36.8 °C)  Heart Rate:  [58-93] 58  Resp:  [18] 18  BP: (126-133)/(69-88) 131/88    Objective:  General Appearance:  Comfortable and in no acute distress.    Vital signs: (most recent): Blood pressure 131/88, pulse 58, temperature 98.3 °F (36.8 °C), temperature source Oral, resp. rate 18, height 190.5 cm (75\"), weight 124 kg (273 lb 6.4 oz), SpO2 95 %.  Vital signs are normal.  No fever.    Output: Producing urine (balanitis noted).    HEENT: Normal HEENT exam.    Lungs:  Normal effort and normal respiratory rate.  Breath sounds clear to auscultation.    Heart: Normal rate.  Regular rhythm.    Chest: Symmetric chest wall expansion. No chest wall tenderness.    Abdomen: Abdomen is soft.  Bowel sounds are normal.   There is generalized tenderness.  There is no rebound tenderness.  There is guarding (voluntary).  (Pt is very quick to react when I try to examine his belly, with distraction he is not very TTP at all, I think he has h/o abdominal issues that have made him very anxious regarding exam of abdomen, CT was reassuring--no acute inflammation noted).     Extremities: There is no dependent edema.  (SCDs in place)  Pulses: Distal pulses are intact.    Neurological: Patient is alert and oriented to person, place " and time.    Skin:  Warm and dry.  No rash.             Results Review:     I reviewed the patient's new clinical results.  I reviewed the patient's new imaging results and agree with the interpretation.  I reviewed the patient's other test results and agree with the interpretation  I personally viewed and interpreted the patient's EKG/Telemetry data  Discussed with pt and RN    Medication Review: reviewed and adjusted    Assessment/Plan       Chest pain    TBI (traumatic brain injury) (CMS/[facility])    Hypertension    UTI (urinary tract infection)    Coronary artery disease    Hyperlipidemia    Balanitis    Elevated MCV    Hypokalemia    Elevated d-dimer    DM2 (diabetes mellitus, type 2) (CMS/[facility])    Folate deficiency          Plan:   (Pleasant 60yo gentleman with h/o TBI, recurrent UTIs, CAD, HTN, and DM2 who was admitted with substernal chest pain  He has ruled out for ACS with neg Trop x 4, CTA chest was negative for PE  He had normal nuclear stress test in September of last year  Card felt he may have pericarditis given changes on EKG, Echo is pending,  inflammatory markers are high  Appreciate Card attention to pt, their note today recommends adding Zithromax to Rocephin to treat potential community-acquired PNA, Dr. Tirado believes pt has a pleuritis due to mild parapneumonic effusions that aren't seen on CT, will start Zithromax and also start IS and try to wean O2 as able  DDimer is elevated, venous u/s of BLEs is negative for DVT  Continue Rocephin for possible UTI and Lotrisone for balanitis, WBC falling with current regimen, urine culture not helpful  Appreciate  attention to pt  MCV elevated, B12 normal and folate is low, will replace  DC IVFs  Replacing K+ orally  Sugars are acceptable, HgbA1c is good at 6.00  ).       Darvin Roasles MD  08/13/19  4:51 PM    Time: 30min

## 2019-08-14 LAB
ANION GAP SERPL CALCULATED.3IONS-SCNC: 13.9 MMOL/L (ref 5–15)
BUN BLD-MCNC: 8 MG/DL (ref 6–20)
BUN/CREAT SERPL: 14 (ref 7–25)
CALCIUM SPEC-SCNC: 8.6 MG/DL (ref 8.6–10.5)
CHLORIDE SERPL-SCNC: 100 MMOL/L (ref 98–107)
CO2 SERPL-SCNC: 22.1 MMOL/L (ref 22–29)
CREAT BLD-MCNC: 0.57 MG/DL (ref 0.76–1.27)
DEPRECATED RDW RBC AUTO: 49.7 FL (ref 37–54)
ERYTHROCYTE [DISTWIDTH] IN BLOOD BY AUTOMATED COUNT: 13 % (ref 12.3–15.4)
GFR SERPL CREATININE-BSD FRML MDRD: >150 ML/MIN/1.73
GLUCOSE BLD-MCNC: 204 MG/DL (ref 65–99)
GLUCOSE BLDC GLUCOMTR-MCNC: 124 MG/DL (ref 70–130)
GLUCOSE BLDC GLUCOMTR-MCNC: 131 MG/DL (ref 70–130)
GLUCOSE BLDC GLUCOMTR-MCNC: 139 MG/DL (ref 70–130)
GLUCOSE BLDC GLUCOMTR-MCNC: 140 MG/DL (ref 70–130)
HCT VFR BLD AUTO: 39.9 % (ref 37.5–51)
HGB BLD-MCNC: 13.4 G/DL (ref 13–17.7)
MCH RBC QN AUTO: 34.8 PG (ref 26.6–33)
MCHC RBC AUTO-ENTMCNC: 33.6 G/DL (ref 31.5–35.7)
MCV RBC AUTO: 103.6 FL (ref 79–97)
PLATELET # BLD AUTO: 335 10*3/MM3 (ref 140–450)
PMV BLD AUTO: 9.8 FL (ref 6–12)
POTASSIUM BLD-SCNC: 3.5 MMOL/L (ref 3.5–5.2)
RBC # BLD AUTO: 3.85 10*6/MM3 (ref 4.14–5.8)
SODIUM BLD-SCNC: 136 MMOL/L (ref 136–145)
WBC NRBC COR # BLD: 10.48 10*3/MM3 (ref 3.4–10.8)

## 2019-08-14 PROCEDURE — 94799 UNLISTED PULMONARY SVC/PX: CPT

## 2019-08-14 PROCEDURE — 85027 COMPLETE CBC AUTOMATED: CPT | Performed by: HOSPITALIST

## 2019-08-14 PROCEDURE — 25010000002 AZITHROMYCIN PER 500 MG: Performed by: HOSPITALIST

## 2019-08-14 PROCEDURE — 80048 BASIC METABOLIC PNL TOTAL CA: CPT | Performed by: HOSPITALIST

## 2019-08-14 PROCEDURE — 25010000002 MORPHINE PER 10 MG: Performed by: INTERNAL MEDICINE

## 2019-08-14 PROCEDURE — 99232 SBSQ HOSP IP/OBS MODERATE 35: CPT | Performed by: INTERNAL MEDICINE

## 2019-08-14 PROCEDURE — 25010000002 CEFTRIAXONE PER 250 MG: Performed by: INTERNAL MEDICINE

## 2019-08-14 PROCEDURE — 82962 GLUCOSE BLOOD TEST: CPT

## 2019-08-14 PROCEDURE — 25010000002 FUROSEMIDE PER 20 MG: Performed by: INTERNAL MEDICINE

## 2019-08-14 RX ORDER — POTASSIUM CHLORIDE 750 MG/1
40 CAPSULE, EXTENDED RELEASE ORAL ONCE
Status: COMPLETED | OUTPATIENT
Start: 2019-08-14 | End: 2019-08-14

## 2019-08-14 RX ORDER — FUROSEMIDE 10 MG/ML
20 INJECTION INTRAMUSCULAR; INTRAVENOUS ONCE
Status: COMPLETED | OUTPATIENT
Start: 2019-08-14 | End: 2019-08-14

## 2019-08-14 RX ADMIN — MORPHINE SULFATE 4 MG: 2 INJECTION, SOLUTION INTRAMUSCULAR; INTRAVENOUS at 16:24

## 2019-08-14 RX ADMIN — AZITHROMYCIN 500 MG: 500 INJECTION, POWDER, LYOPHILIZED, FOR SOLUTION INTRAVENOUS at 18:38

## 2019-08-14 RX ADMIN — POTASSIUM CHLORIDE 40 MEQ: 750 CAPSULE, EXTENDED RELEASE ORAL at 10:57

## 2019-08-14 RX ADMIN — BUDESONIDE AND FORMOTEROL FUMARATE DIHYDRATE 2 PUFF: 80; 4.5 AEROSOL RESPIRATORY (INHALATION) at 19:15

## 2019-08-14 RX ADMIN — CEFTRIAXONE SODIUM 1 G: 1 INJECTION, SOLUTION INTRAVENOUS at 21:35

## 2019-08-14 RX ADMIN — MORPHINE SULFATE 4 MG: 2 INJECTION, SOLUTION INTRAMUSCULAR; INTRAVENOUS at 02:25

## 2019-08-14 RX ADMIN — MORPHINE SULFATE 4 MG: 2 INJECTION, SOLUTION INTRAMUSCULAR; INTRAVENOUS at 22:28

## 2019-08-14 RX ADMIN — FOLIC ACID 1 MG: 1 TABLET ORAL at 08:35

## 2019-08-14 RX ADMIN — SODIUM CHLORIDE, PRESERVATIVE FREE 3 ML: 5 INJECTION INTRAVENOUS at 21:36

## 2019-08-14 RX ADMIN — CLOTRIMAZOLE AND BETAMETHASONE DIPROPIONATE: 10; .5 CREAM TOPICAL at 10:58

## 2019-08-14 RX ADMIN — SODIUM CHLORIDE, PRESERVATIVE FREE 3 ML: 5 INJECTION INTRAVENOUS at 08:35

## 2019-08-14 RX ADMIN — FUROSEMIDE 20 MG: 10 INJECTION, SOLUTION INTRAMUSCULAR; INTRAVENOUS at 10:57

## 2019-08-14 RX ADMIN — CLOTRIMAZOLE AND BETAMETHASONE DIPROPIONATE: 10; .5 CREAM TOPICAL at 22:21

## 2019-08-14 RX ADMIN — BUDESONIDE AND FORMOTEROL FUMARATE DIHYDRATE 2 PUFF: 80; 4.5 AEROSOL RESPIRATORY (INHALATION) at 08:31

## 2019-08-14 RX ADMIN — MORPHINE SULFATE 4 MG: 2 INJECTION, SOLUTION INTRAMUSCULAR; INTRAVENOUS at 08:35

## 2019-08-14 RX ADMIN — ASPIRIN 81 MG: 81 TABLET, COATED ORAL at 08:35

## 2019-08-14 NOTE — PROGRESS NOTES
Cardiology progress note  Christian Rome MD, PhD      Patient Care Team:  Heather Peraza APRN as PCP - General (Family Medicine)          CHIEF COMPLAINT: SOA/CP      SUBJECTIVE:  Per Dr. Tirado consult note:  Kd is a 59-year-old male patient who reportedly sees Dr. Kat at [facility] cardi[facility].  He has multiple coronary artery risk factors including diabetes mellitus type II, dyslipidemia, hypertension and tobacco use.  He was admitted to [facility] for complaints of chest pain and shortness of breath.     We were consulted for his chest pain complaint and his reported cardiac history.  Of note his chest pain syndrome consists of inspiratory substernal chest pain rated at 5 out of 10 that radiates to his flanks is not worsened with exertion but is neither improved with lying flat or sitting upright or with rest.  It is been relatively chronic but again worsens with respiration.  He denies any associated fever or chills.  His dyspnea is chronic at rest and does worsen with exertion.  The above his evolved over the past 2 weeks.  He did have an elevated temperature of almost 100 during his hospital stay.  In addition he has a leukocytosis peaking at 19 and currently resolving to 11 today.  In the setting he was placed on ceftriaxone for what appears to be a urinary tract infection by urinalysis but not by clinical syndrome.  Of note I personally reviewed his chest x-ray from 8/11/2019 which shows small bilateral pleural effusions but no acute pulmonary process and no pulmonary edema.  I personally reviewed his ECG from this hospitalization which shows normal sinus rhythm with inferior T wave flattening otherwise nonspecific ST-T wave changes.  In addition I reviewed his echocardiogram images from 8/13/2018 which showed normal LV systolic function, normal RV systolic function, no significant valvular heart disease and no evidence of pericardial effusion.  So had no evidence of  elevated right-sided filling pressures.     He underwent cycling of his cardiac enzymes which showed negative troponins across the board.    ===================================================================  Today still complaints of CP with inspiration and use of IS, + shortness of breath as well but otherwise he remains on room air doing well today.  He has no significant peripheral edema but does have mild JVD with HJR on exam.  There are also very faint bilateral basilar crackles on exam and he is not currently on a loop diuretic.  I will give him 20 mg IV Lasix x1 today with normal renal function normal electrolytes.  Replace electrolytes as needed.  He will continue on antibiotics for pneumonia and UTI treatment.  No need for ischemic evaluation at this point.      REVIEW OF SYSTEMS: Some 14-point review of systems is negative except what is mentioned in the HPI relative to the current complaint.     PAST MEDICAL HISTORY:   Past Medical History:   Diagnosis Date   • Arthritis    • Asthma    • Concussion    • Coronary artery disease    • Diabetes mellitus (CMS/HCC)    • Diverticulitis    • Elevated cholesterol    • GERD (gastroesophageal reflux disease)    • Hyperlipidemia    • Hypertension    • Kidney stone    • Migraine    • TBI (traumatic brain injury) (CMS/HCC)    • Torn meniscus        ALLERGIES:   No Known Allergies      PAST SURGICAL HISTORY:   Past Surgical History:   Procedure Laterality Date   • ABDOMINAL SURGERY     • CARDIAC CATHETERIZATION     • COLON RESECTION     • COLON SURGERY     • COLONOSCOPY     • ENDOSCOPY     • HERNIA REPAIR     • SKIN BIOPSY            FAMILY HISTORY:   Family History   Problem Relation Age of Onset   • Alcohol abuse Mother    • Arthritis Mother    • Asthma Mother    • Cancer Mother    • Hearing loss Mother    • Alcohol abuse Father    • Arthritis Father    • Diabetes Father    • Heart disease Father    • Hyperlipidemia Father    • Hypertension Father          SOCIAL  HISTORY:   Social History     Socioeconomic History   • Marital status: Unknown     Spouse name: Not on file   • Number of children: Not on file   • Years of education: Not on file   • Highest education level: Not on file   Tobacco Use   • Smoking status: Former Smoker     Packs/day: 0.50     Years: 15.00     Pack years: 7.50     Types: Cigarettes     Start date:      Last attempt to quit:      Years since quittin.6   • Smokeless tobacco: Never Used   Substance and Sexual Activity   • Alcohol use: Yes     Alcohol/week: 0.6 - 1.8 oz     Types: 1 - 3 Glasses of wine per week     Comment: DRINKS 3-4X'S A WEEK   • Drug use: No   • Sexual activity: Defer     Birth control/protection: None       CURRENT MEDICATIONS:     Current Facility-Administered Medications:   •  acetaminophen (TYLENOL) tablet 650 mg, 650 mg, Oral, Q4H PRN, Dominik Grossman MD, 650 mg at 19 0955  •  aspirin EC tablet 81 mg, 81 mg, Oral, Daily, Gina Gutierrez Fort, APRN, 81 mg at 19 0835  •  azithromycin (ZITHROMAX) 500 mg 0.9% NaCl (Add-vantage) 250 mL, 500 mg, Intravenous, Q24H, Darvin Rosales MD, 500 mg at 19 1812  •  budesonide-formoterol (SYMBICORT) 80-4.5 MCG/ACT inhaler 2 puff, 2 puff, Inhalation, BID - RT, Darvin Rosales MD, 2 puff at 19 0831  •  cefTRIAXone (ROCEPHIN) IVPB 1 g, 1 g, Intravenous, Q24H, Dominik Grossman MD, Last Rate: 100 mL/hr at 19, 1 g at 19  •  clotrimazole-betamethasone (LOTRISONE) 1-0.05 % cream, , Topical, Q12H, Dominik Grossman MD  •  dextrose (D50W) 25 g/ 50mL Intravenous Solution 25 g, 25 g, Intravenous, Q15 Min PRN, Dominik Grossman MD  •  dextrose (GLUTOSE) oral gel 15 g, 15 g, Oral, Q15 Min PRN, Dominik Grossman MD  •  folic acid (FOLVITE) tablet 1 mg, 1 mg, Oral, Daily, Darvin oRsales MD, 1 mg at 19 0858  •  furosemide (LASIX) injection 20 mg, 20 mg, Intravenous, Once, Christian Rome MD  •  glucagon (human recombinant) (GLUCAGEN DIAGNOSTIC) injection 1  mg, 1 mg, Subcutaneous, PRN, Dominik Grossman MD  •  insulin lispro (humaLOG) injection 0-9 Units, 0-9 Units, Subcutaneous, 4x Daily With Meals & Nightly, Dominik Grossman MD, 4 Units at 08/13/19 2104  •  morphine injection 4 mg, 4 mg, Intravenous, Q4H PRN, Dominik Grossman MD, 4 mg at 08/14/19 0835  •  nitroglycerin (NITROSTAT) SL tablet 0.4 mg, 0.4 mg, Sublingual, Q5 Min PRN, Dominik Grossman MD  •  ondansetron (ZOFRAN) injection 4 mg, 4 mg, Intravenous, Q6H PRN, Dominik Grossman MD  •  potassium chloride (KLOR-CON) packet 40 mEq, 40 mEq, Oral, PRN, Darvin Rosales MD  •  potassium chloride (MICRO-K) CR capsule 40 mEq, 40 mEq, Oral, PRN, Darvin Rosales MD  •  potassium chloride (MICRO-K) CR capsule 40 mEq, 40 mEq, Oral, Once, Christian Rome MD  •  sodium chloride 0.9 % flush 10 mL, 10 mL, Intravenous, PRN, Niki Torres MD  •  sodium chloride 0.9 % flush 3 mL, 3 mL, Intravenous, Q12H, Dominik Grossman MD, 3 mL at 08/14/19 0835  •  sodium chloride 0.9 % flush 3-10 mL, 3-10 mL, Intravenous, PRN, Dominik Grossman MD      DIAGNOSTIC DATA:     I reviewed the patient's new clinical results.    Lab Results (last 24 hours)     Procedure Component Value Units Date/Time    POC Glucose Once [[id number]]  (Abnormal) Collected:  08/14/19 0628    Specimen:  Blood Updated:  08/14/19 0631     Glucose 131 mg/dL     Basic Metabolic Panel [[id number]]  (Abnormal) Collected:  08/14/19 0410    Specimen:  Blood Updated:  08/14/19 0511     Glucose 204 mg/dL      BUN 8 mg/dL      Creatinine 0.57 mg/dL      Sodium 136 mmol/L      Potassium 3.5 mmol/L      Chloride 100 mmol/L      CO2 22.1 mmol/L      Calcium 8.6 mg/dL      eGFR  African Amer >150 mL/min/1.73      BUN/Creatinine Ratio 14.0     Anion Gap 13.9 mmol/L     Narrative:       GFR Normal >60  Chronic Kidney Disease <60  Kidney Failure <15    CBC (No Diff) [[id number]]  (Abnormal) Collected:  08/14/19 0410    Specimen:  Blood Updated:  08/14/19 0439     WBC 10.48 10*3/mm3      RBC 3.85 10*6/mm3       Hemoglobin 13.4 g/dL      Hematocrit 39.9 %      .6 fL      MCH 34.8 pg      MCHC 33.6 g/dL      RDW 13.0 %      RDW-SD 49.7 fl      MPV 9.8 fL      Platelets 335 10*3/mm3     POC Glucose Once [[id number]]  (Abnormal) Collected:  08/13/19 2051    Specimen:  Blood Updated:  08/13/19 2058     Glucose 226 mg/dL     POC Glucose Once [[id number]]  (Abnormal) Collected:  08/13/19 1603    Specimen:  Blood Updated:  08/13/19 1604     Glucose 138 mg/dL     POC Glucose Once [[id number]]  (Abnormal) Collected:  08/13/19 1052    Specimen:  Blood Updated:  08/13/19 1058     Glucose 175 mg/dL           Imaging Results (last 24 hours)     ** No results found for the last 24 hours. **          Xray reviewed personally by physician.      ECG reviewed personally by physician  ECG/EMG Results (most recent)     Procedure Component Value Units Date/Time    ECG 12 Lead [[id number]] Collected:  08/11/19 1723     Updated:  08/12/19 1136    Narrative:       HEART RATE= 101  bpm  RR Interval= 596  ms  RI Interval= 194  ms  P Horizontal Axis= 264  deg  P Front Axis= 60  deg  QRSD Interval= 80  ms  QT Interval= 332  ms  QRS Axis= 13  deg  T Wave Axis= 28  deg  - ABNORMAL ECG -  Sinus tachycardia  Ventricular premature complex  Mild inferior ST elevations- consider inferior injury versus pericarditis  NO PRIOR TRACING AVAILABLE FOR COMPARISON  Electronically Signed By: Suzy [facility] 12-Aug-2019 11:36:27  Date and Time of Study: 2019-08-11 17:23:39    ECG 12 Lead [[id number]] Collected:  08/13/19 0558     Updated:  08/13/19 0824    Narrative:       HEART RATE= 76  bpm  RR Interval= 792  ms  RI Interval= 222  ms  P Horizontal Axis= 11  deg  P Front Axis= 51  deg  QRSD Interval= 88  ms  QT Interval= 371  ms  QRS Axis= 51  deg  T Wave Axis= 5  deg  - ABNORMAL ECG -  Sinus rhythm  Prolonged RI interval  Low voltage, precordial leads  Non-specific STT wave change  ns st t changes new  Electronically Signed By: Tim Krueger ([facility])  ([facility]) 13-Aug-2019 08:24:32  Date and Time of Study: 2019-08-13 05:58:13    Adult Transthoracic Echo Complete W/ Cont if Necessary Per Protocol [[id number]] Collected:  08/13/19 0733     Updated:  08/13/19 0840     BSA 2.5 m^2      IVSd 1.1 cm      LVIDd 4.8 cm      LVIDs 3.6 cm      LVPWd 1.1 cm      IVS/LVPW 1.0     FS 25.0 %      EDV(Teich) 107.5 ml      ESV(Teich) 54.4 ml      EF(Teich) 49.4 %      EDV(cubed) 110.6 ml      ESV(cubed) 46.7 ml      EF(cubed) 57.8 %      LV mass(C)d 194.0 grams      LV mass(C)dI 77.4 grams/m^2      SV(Teich) 53.1 ml      SI(Teich) 21.2 ml/m^2      SV(cubed) 63.9 ml      SI(cubed) 25.5 ml/m^2      Ao root diam 4.0 cm      Ao root area 12.6 cm^2      ACS 2.2 cm      asc Aorta Diam 3.7 cm      LVOT diam 2.2 cm      LVOT area 3.8 cm^2      LVOT area(traced) 3.8 cm^2      RVOT diam 3.1 cm      RVOT area 7.5 cm^2      LVLd ap4 10.2 cm      EDV(MOD-sp4) 133.0 ml      LVLs ap4 8.8 cm      ESV(MOD-sp4) 52.0 ml      EF(MOD-sp4) 60.9 %      LVLd ap2 9.3 cm      EDV(MOD-sp2) 119.0 ml      LVLs ap2 8.1 cm      ESV(MOD-sp2) 59.0 ml      EF(MOD-sp2) 50.4 %      SV(MOD-sp4) 81.0 ml      SI(MOD-sp4) 32.3 ml/m^2      SV(MOD-sp2) 60.0 ml      SI(MOD-sp2) 24.0 ml/m^2      Ao root area (BSA corrected) 1.6     LV [place]on Vol (BSA corrected) 53.1 ml/m^2      LV Sys Vol (BSA corrected) 20.8 ml/m^2      MV A dur 0.14 sec      MV E max lisa 96.5 cm/sec      MV A max lisa 67.4 cm/sec      MV E/A 1.4     MV V2 max 95.7 cm/sec      MV max PG 3.7 mmHg      MV V2 mean 56.1 cm/sec      MV mean PG 2.0 mmHg      MV V2 VTI 28.9 cm      MVA(VTI) 2.7 cm^2      MV P1/2t max lisa 95.4 cm/sec      MV P1/2t 84.7 msec      MVA(P1/2t) 2.6 cm^2      MV dec slope 330.0 cm/sec^2      MV dec time 199 sec      Ao pk lisa 109.0 cm/sec      Ao max PG 4.8 mmHg      Ao max PG (full) 1.1 mmHg      Ao V2 mean 72.8 cm/sec      Ao mean PG 3.0 mmHg      Ao mean PG (full) 1.0 mmHg      Ao V2 VTI 23.8 cm      MICH(I,A) 3.3 cm^2      MICH(I,D) 3.3  cm^2      MICH(V,A) 3.3 cm^2      MICH(V,D) 3.3 cm^2      AI max gabe 410.0 cm/sec      AI max PG 67.2 mmHg      AI dec slope 183.5 cm/sec^2      AI P1/2t 654.4 msec      LV V1 max PG 3.6 mmHg      LV V1 mean PG 2.0 mmHg      LV V1 max 95.3 cm/sec      LV V1 mean 62.5 cm/sec      LV V1 VTI 20.5 cm      SV(Ao) 299.1 ml      SI(Ao) 119.4 ml/m^2      SV(LVOT) 77.9 ml      SV(RVOT) 101.9 ml      SI(LVOT) 31.1 ml/m^2      PA V2 max 104.0 cm/sec      PA max PG 4.3 mmHg      PA max PG (full) 2.6 mmHg       CV ECHO GALILEA - PVA(V,A) 4.7 cm^2       CV ECHO GALILEA - PVA(V,D) 4.7 cm^2      PA acc time 0.11 sec      RV V1 max PG 1.7 mmHg      RV V1 mean PG 1.0 mmHg      RV V1 max 65.0 cm/sec      RV V1 mean 37.3 cm/sec      RV V1 VTI 13.5 cm      RAP systole 3.0 mmHg      PA pr(Accel) 30.4 mmHg      Qp/Qs 1.3     MVA P1/2T LCG 2.3 cm^2       CV ECHO GALILEA - BZI_BMI 34.1 kilograms/m^2       CV ECHO GALILEA - BSA(HAYCOCK) 2.6 m^2       CV ECHO GALILEA - BZI_METRIC_WEIGHT 123.8 kg       CV ECHO GALILEA - BZI_METRIC_HEIGHT 190.5 cm      Target HR (85%) 138 bpm      Max. Pred. HR (100%) 162 bpm       CV VAS BP RIGHT /69 mmHg      TDI S' 16.00 cm/sec      RV Base 3.27 cm      Dimensionless Index 0.9 (DI)      LA Volume Index 27.0 mL/m2      Avg E/e' ratio 9.90     EF(MOD-bp) 56.6 %      Lat Peak E' Gabe 10.8 cm/sec      Med Peak E' Gabe 8.70 cm/sec      TAPSE (>1.6) 2.50 cm2     Narrative:       · Left ventricular systolic function is normal. Calculated EF = 56.6%.  · Left ventricular wall thickness is consistent with borderline concentric   hypertrophy.  · Mild aortic valve regurgitation is present.  · Mild mitral valve regurgitation is present  · Mild tricuspid valve regurgitation is present.  · Mild pulmonic valve regurgitation is present.  · Mild dilation of the aortic root is present.               PHYSICAL EXAMINATION:  VITAL SIGNS: Temp:  [97.6 °F (36.4 °C)-98.3 °F (36.8 °C)] 97.6 °F (36.4 °C)  Heart Rate:  [58-89] 89  Resp:  " [16-18] 18  BP: (131-146)/(77-88) 142/86   Flowsheet Rows      First Filed Value   Admission Height  182.9 cm (72\") Documented at 08/11/2019 1726   Admission Weight  120 kg (264 lb) Documented at 08/11/2019 1726        GENERAL: Alert and oriented x3, afebrile. Vital signs stable, appeared comfortable, nondistressed, and appears stated age. Generalized weakness. Responds to questions appropriately.   HEENT: Normocephalic, atraumatic. Pupils equal, round and reactive to light. Extraocular movements intact bilaterally. Trachea midline.  Mucous membranes are moist.   NECK: Supple.  Mild HJR, mild JVD. Trachea midline, no LAD  CHEST: Clear to auscultation bilaterally anteriorly; mild bilateral basilar crackles on auscultation without rhonchi, or wheezes  HEART: Regular rate and rhythm. No rubs, murmurs or gallops.,  No S3-S4 no heave no lift  ABDOMEN: Soft, nontender, nondistended. Bowel sounds are otherwise positive.   EXTREMITIES: No clubbing, cyanosis, or edema. Moves all extremities  SKIN: Warm and dry. No ecchymoses, petechiae or rashes.   MUSCULOSKELETAL: No bony abnormalities. Range of motion normal. No crepitus. No joint swelling or erythema.   NEUROLOGIC: Cranial nerves II-XII intact bilaterally. No focal neurologic deficits. Mini-Mental status exam was deferred.   LYMPHATICS: No lymphadenopathy.     ASSESSMENT AND PLAN:   • Chest pain  Chest pain atypical for angina.  More pleuritic in description,  echo normal and unremarkable, mild crackles on exam, continue treatment for pneumonia, He is on ceftriaxone and azithromycin.    Leukocytosis improving, defer noninvasive ischemic evaluation presently, non-decompensated no evidence of any unstable signs or symptoms.   • TBI (traumatic brain injury) (CMS/[facility])   • Hypertension  Being well controlled on medical therapy while in house.   • UTI (urinary tract infection)   • Coronary artery disease  Currently nonobstructive as per previous cardiac catheterization by " patient report.  Assume clinically silent at this time.  No evidence of acute coronary syndrome and negative biomarkers.do not believe that anticoagulation is necessary at this time.  May consider an out stress testing , if conditions change will take for invasive ischemic evaluation if he develops any hemodynamic or electrical instability with signs of EKG changes or ongoing chest pain refractory to medical therapy or evidenced by wall motion abnormality on echo.   • Hyperlipidemia   • Balanitis   • Elevated MCV   • Hypokalemia   • Elevated d-dimer   • DM2 (diabetes mellitus, type 2) (CMS/HCC)  A cardiac risk factor but not likely contributing to his presentation.     Pneumonia, per primary  -Clinical diagnosis at this time without significant radiographic confirmation.  Would tailor antibiotics for community-acquired pneumonia.  Will hold off on further cardiac work-up at this time.     Single dose Lasix 20 mg IV today for crackles in bilateral bases, normal renal function, replace electro lites as needed, no scheduled diuretics on board presently.    Thank you very much for the consult.  It is a pleasure to be involved in his cardiac care.  We will continue to follow please call with any questions or concerns  Christian Rome and PhD      Christian Rome MD  08/14/19  9:32 AM      Time: Greater than 30 minutes was spent with the patient today on encounter with education greater 50% of time on diuretics, possible deferred ischemic work-up once systemically improved, ongoing treatment for infection with Rocephin and azithromycin, unchanged and unremarkable ECG as well as unremarkable troponin enzymes.

## 2019-08-14 NOTE — PROGRESS NOTES
"   LOS: 2 days   Patient Care Team:  Heather Peraza APRN as PCP - General (Family Medicine)    Chief Complaint: chest pain    Subjective     Feeling okay today. Still c/o chest pain but is maybe somewhat improved today. RN reports he is putting his own O2 on when staff not in room. He also isn't wearing SCDs as directed.         Subjective:  Symptoms:  Improved.  He reports chest pain.  No shortness of breath, malaise, cough, weakness, headache, chest pressure, anorexia, diarrhea or anxiety.    Diet:  Adequate intake.  No nausea or vomiting.    Activity level: Impaired due to pain.    Pain:  He complains of pain that is mild.  He reports pain is improving.  Pain is well controlled.        History taken from: patient chart RN    Objective     Vital Signs  Temp:  [97.6 °F (36.4 °C)-98.1 °F (36.7 °C)] 98.1 °F (36.7 °C)  Heart Rate:  [58-89] 58  Resp:  [16-18] 18  BP: (134-146)/(77-86) 136/83    Objective:  General Appearance:  Comfortable and in no acute distress.    Vital signs: (most recent): Blood pressure 136/83, pulse 58, temperature 98.1 °F (36.7 °C), temperature source Oral, resp. rate 18, height 190.5 cm (75\"), weight 124 kg (273 lb 6.4 oz), SpO2 95 %.  Vital signs are normal.  No fever.    Output: Producing urine (balanitis noted).    HEENT: Normal HEENT exam.    Lungs:  Normal effort and normal respiratory rate.  Breath sounds clear to auscultation.    Heart: Normal rate.  Regular rhythm.    Chest: Symmetric chest wall expansion. No chest wall tenderness.    Abdomen: Abdomen is soft.  Bowel sounds are normal.   There is no abdominal tenderness.     Extremities: There is no dependent edema.  (SCDs in place)  Pulses: Distal pulses are intact.    Neurological: Patient is alert and oriented to person, place and time.    Skin:  Warm and dry.  No rash.             Results Review:     I reviewed the patient's new clinical results.  I reviewed the patient's other test results and agree with the " interpretation  I personally viewed and interpreted the patient's EKG/Telemetry data  Discussed with pt and RN    Medication Review: reviewed    Assessment/Plan       Chest pain    TBI (traumatic brain injury) (CMS/[facility])    Hypertension    UTI (urinary tract infection)    Coronary artery disease    Hyperlipidemia    Balanitis    Elevated MCV    Hypokalemia    Elevated d-dimer    DM2 (diabetes mellitus, type 2) (CMS/[facility])    Folate deficiency          Plan:   (Pleasant 58yo gentleman with h/o TBI, recurrent UTIs, CAD, HTN, and DM2 who was admitted with substernal chest pain    He has ruled out for ACS with neg Trop x 4, CTA chest was negative for PE  He had normal nuclear stress test in September of last year  Card felt he may have pericarditis given changes on EKG, Echo is unremarkable,  inflammatory markers are high  Appreciate Card attention to pt, their notes now recommend adding Zithromax to Rocephin to treat potential community-acquired PNA . . . Dr. Tirado believes pt has a pleuritis due to mild parapneumonic effusions that aren't seen on CT, started Zithromax and also started IS, trying to wean O2 as able  DDimer was elevated, venous u/s of BLEs is negative for DVT  Continue Rocephin for possible UTI and Lotrisone for balanitis, WBC falling with current regimen, urine culture not helpful  Appreciate  attention to pt  MCV elevated, B12 normal and folate is low, replacing  DC'd IVFs  Replacing K+ orally  Sugars are acceptable, HgbA1c is good at 6.00  ).       Darvin Rosales MD  08/14/19  3:09 PM    Time: 20min

## 2019-08-14 NOTE — PLAN OF CARE
Problem: Patient Care Overview  Goal: Plan of Care Review  Outcome: Ongoing (interventions implemented as appropriate)   08/13/19 2155 08/14/19 1410 08/14/19 1534   Coping/Psychosocial   Plan of Care Reviewed With --  patient --    Plan of Care Review   Progress improving --  --    OTHER   Outcome Summary --  --  VSS, pt complaints of pain treated with IV morphine, pt weaned to RA, pt educated on purpose of SCD's and use encouraged, IV abx, will continue to closely monitor.     Goal: Individualization and Mutuality  Outcome: Ongoing (interventions implemented as appropriate)    Goal: Discharge Needs Assessment  Outcome: Ongoing (interventions implemented as appropriate)    Goal: Interprofessional Rounds/Family Conf  Outcome: Ongoing (interventions implemented as appropriate)      Problem: Fall Risk (Adult)  Goal: Identify Related Risk Factors and Signs and Symptoms  Outcome: Ongoing (interventions implemented as appropriate)    Goal: Absence of Fall  Outcome: Ongoing (interventions implemented as appropriate)      Problem: Pain, Acute (Adult)  Goal: Identify Related Risk Factors and Signs and Symptoms  Outcome: Ongoing (interventions implemented as appropriate)    Goal: Acceptable Pain Control/Comfort Level  Outcome: Ongoing (interventions implemented as appropriate)      Problem: Skin Injury Risk (Adult)  Goal: Identify Related Risk Factors and Signs and Symptoms  Outcome: Ongoing (interventions implemented as appropriate)    Goal: Skin Health and Integrity  Outcome: Ongoing (interventions implemented as appropriate)

## 2019-08-15 ENCOUNTER — APPOINTMENT (OUTPATIENT)
Dept: GENERAL RADIOLOGY | Facility: HOSPITAL | Age: 59
End: 2019-08-15

## 2019-08-15 PROBLEM — K59.00 CONSTIPATION: Status: ACTIVE | Noted: 2019-08-15

## 2019-08-15 LAB
GLUCOSE BLDC GLUCOMTR-MCNC: 126 MG/DL (ref 70–130)
GLUCOSE BLDC GLUCOMTR-MCNC: 148 MG/DL (ref 70–130)
GLUCOSE BLDC GLUCOMTR-MCNC: 152 MG/DL (ref 70–130)
GLUCOSE BLDC GLUCOMTR-MCNC: 192 MG/DL (ref 70–130)

## 2019-08-15 PROCEDURE — 25010000002 MORPHINE PER 10 MG: Performed by: INTERNAL MEDICINE

## 2019-08-15 PROCEDURE — 94799 UNLISTED PULMONARY SVC/PX: CPT

## 2019-08-15 PROCEDURE — 25010000002 AZITHROMYCIN PER 500 MG: Performed by: HOSPITALIST

## 2019-08-15 PROCEDURE — 63710000001 INSULIN LISPRO (HUMAN) PER 5 UNITS: Performed by: INTERNAL MEDICINE

## 2019-08-15 PROCEDURE — 74018 RADEX ABDOMEN 1 VIEW: CPT

## 2019-08-15 PROCEDURE — 25010000002 CEFTRIAXONE PER 250 MG: Performed by: INTERNAL MEDICINE

## 2019-08-15 PROCEDURE — 99232 SBSQ HOSP IP/OBS MODERATE 35: CPT | Performed by: INTERNAL MEDICINE

## 2019-08-15 PROCEDURE — 82962 GLUCOSE BLOOD TEST: CPT

## 2019-08-15 RX ORDER — SENNA AND DOCUSATE SODIUM 50; 8.6 MG/1; MG/1
2 TABLET, FILM COATED ORAL NIGHTLY
Status: DISCONTINUED | OUTPATIENT
Start: 2019-08-15 | End: 2019-08-18 | Stop reason: HOSPADM

## 2019-08-15 RX ORDER — GABAPENTIN 300 MG/1
300 CAPSULE ORAL EVERY 8 HOURS SCHEDULED
Status: DISCONTINUED | OUTPATIENT
Start: 2019-08-15 | End: 2019-08-18 | Stop reason: HOSPADM

## 2019-08-15 RX ORDER — HYDROCODONE BITARTRATE AND ACETAMINOPHEN 5; 325 MG/1; MG/1
1 TABLET ORAL EVERY 6 HOURS PRN
Status: DISCONTINUED | OUTPATIENT
Start: 2019-08-15 | End: 2019-08-18 | Stop reason: HOSPADM

## 2019-08-15 RX ADMIN — INSULIN LISPRO 2 UNITS: 100 INJECTION, SOLUTION INTRAVENOUS; SUBCUTANEOUS at 11:25

## 2019-08-15 RX ADMIN — SODIUM CHLORIDE, PRESERVATIVE FREE 3 ML: 5 INJECTION INTRAVENOUS at 08:10

## 2019-08-15 RX ADMIN — INSULIN LISPRO 2 UNITS: 100 INJECTION, SOLUTION INTRAVENOUS; SUBCUTANEOUS at 08:09

## 2019-08-15 RX ADMIN — AZITHROMYCIN 500 MG: 500 INJECTION, POWDER, LYOPHILIZED, FOR SOLUTION INTRAVENOUS at 17:19

## 2019-08-15 RX ADMIN — SENNOSIDES AND DOCUSATE SODIUM 2 TABLET: 8.6; 5 TABLET ORAL at 20:44

## 2019-08-15 RX ADMIN — MORPHINE SULFATE 4 MG: 2 INJECTION, SOLUTION INTRAMUSCULAR; INTRAVENOUS at 03:03

## 2019-08-15 RX ADMIN — ASPIRIN 81 MG: 81 TABLET, COATED ORAL at 08:09

## 2019-08-15 RX ADMIN — SODIUM CHLORIDE, PRESERVATIVE FREE 3 ML: 5 INJECTION INTRAVENOUS at 20:44

## 2019-08-15 RX ADMIN — GABAPENTIN 300 MG: 300 CAPSULE ORAL at 21:00

## 2019-08-15 RX ADMIN — BUDESONIDE AND FORMOTEROL FUMARATE DIHYDRATE 2 PUFF: 80; 4.5 AEROSOL RESPIRATORY (INHALATION) at 07:11

## 2019-08-15 RX ADMIN — CLOTRIMAZOLE AND BETAMETHASONE DIPROPIONATE: 10; .5 CREAM TOPICAL at 08:09

## 2019-08-15 RX ADMIN — BUDESONIDE AND FORMOTEROL FUMARATE DIHYDRATE 2 PUFF: 80; 4.5 AEROSOL RESPIRATORY (INHALATION) at 19:28

## 2019-08-15 RX ADMIN — CLOTRIMAZOLE AND BETAMETHASONE DIPROPIONATE: 10; .5 CREAM TOPICAL at 21:01

## 2019-08-15 RX ADMIN — POLYETHYLENE GLYCOL 3350 17 G: 17 POWDER, FOR SOLUTION ORAL at 17:18

## 2019-08-15 RX ADMIN — FOLIC ACID 1 MG: 1 TABLET ORAL at 08:09

## 2019-08-15 RX ADMIN — CEFTRIAXONE SODIUM 1 G: 1 INJECTION, SOLUTION INTRAVENOUS at 20:44

## 2019-08-15 RX ADMIN — HYDROCODONE BITARTRATE AND ACETAMINOPHEN 1 TABLET: 5; 325 TABLET ORAL at 23:56

## 2019-08-15 RX ADMIN — MORPHINE SULFATE 4 MG: 2 INJECTION, SOLUTION INTRAMUSCULAR; INTRAVENOUS at 08:09

## 2019-08-15 RX ADMIN — HYDROCODONE BITARTRATE AND ACETAMINOPHEN 1 TABLET: 5; 325 TABLET ORAL at 16:39

## 2019-08-15 RX ADMIN — ACETAMINOPHEN 650 MG: 325 TABLET, FILM COATED ORAL at 20:44

## 2019-08-15 NOTE — PLAN OF CARE
Problem: Patient Care Overview  Goal: Plan of Care Review  Outcome: Ongoing (interventions implemented as appropriate)   08/15/19 4662   Coping/Psychosocial   Plan of Care Reviewed With patient   OTHER   Outcome Summary pain medication switched to PO, bowel regimen started for constipation, KUB, intermittent c/o headache, iv abx, vss, will continue to monitor      Goal: Individualization and Mutuality  Outcome: Ongoing (interventions implemented as appropriate)    Goal: Discharge Needs Assessment  Outcome: Ongoing (interventions implemented as appropriate)    Goal: Interprofessional Rounds/Family Conf  Outcome: Ongoing (interventions implemented as appropriate)      Problem: Fall Risk (Adult)  Goal: Identify Related Risk Factors and Signs and Symptoms  Outcome: Ongoing (interventions implemented as appropriate)    Goal: Absence of Fall  Outcome: Ongoing (interventions implemented as appropriate)      Problem: Pain, Acute (Adult)  Goal: Identify Related Risk Factors and Signs and Symptoms  Outcome: Ongoing (interventions implemented as appropriate)    Goal: Acceptable Pain Control/Comfort Level  Outcome: Ongoing (interventions implemented as appropriate)      Problem: Skin Injury Risk (Adult)  Goal: Identify Related Risk Factors and Signs and Symptoms  Outcome: Ongoing (interventions implemented as appropriate)    Goal: Skin Health and Integrity  Outcome: Ongoing (interventions implemented as appropriate)

## 2019-08-15 NOTE — PROGRESS NOTES
"   LOS: 3 days   Patient Care Team:  Heather Peraza APRN as PCP - General (Family Medicine)    Chief Complaint: headache, tired, no BM    Subjective     Feeling okay today. Still having HAs. Hasn't mentioned them to me, but apparently has mentioned to RN and has been receiving IV morphine around the clock for these. These HAs are not new. Pt usually takes gabapentin at home but hasn't been receiving here. No BM since Saturday.        Subjective:  Symptoms:  Improved.  He reports headache.  No shortness of breath, malaise, cough, chest pain, weakness, chest pressure, anorexia, diarrhea or anxiety.    Diet:  Adequate intake.  No nausea or vomiting.    Activity level: Impaired due to pain.    Pain:  He reports no pain.        History taken from: patient chart RN    Objective     Vital Signs  Temp:  [97.9 °F (36.6 °C)-98.7 °F (37.1 °C)] 98 °F (36.7 °C)  Heart Rate:  [43-90] 77  Resp:  [16-18] 16  BP: (127-151)/(80-85) 127/80    Objective:  General Appearance:  Comfortable and in no acute distress (walking in arriaga without need of assistance).    Vital signs: (most recent): Blood pressure 127/80, pulse 77, temperature 98 °F (36.7 °C), temperature source Oral, resp. rate 16, height 190.5 cm (75\"), weight 124 kg (273 lb 6.4 oz), SpO2 93 %.  Vital signs are normal.  No fever.    Output: Producing urine (balanitis noted) and no stool output.    HEENT: Normal HEENT exam.    Lungs:  Normal effort and normal respiratory rate.  Breath sounds clear to auscultation.    Heart: Normal rate.  Regular rhythm.    Chest: Symmetric chest wall expansion. No chest wall tenderness.    Abdomen: Abdomen is soft.  Bowel sounds are normal.   There is no abdominal tenderness.     Extremities: There is no dependent edema.  (SCDs in place)  Pulses: Distal pulses are intact.    Neurological: Patient is alert and oriented to person, place and time.    Skin:  Warm and dry.  No rash.             Results Review:     I reviewed the patient's new " clinical results.  I reviewed the patient's other test results and agree with the interpretation    Medication Review: reviewed and adjusted    Assessment/Plan       Chest pain    TBI (traumatic brain injury) (CMS/[facility])    Hypertension    UTI (urinary tract infection)    Coronary artery disease    Hyperlipidemia    Balanitis    Elevated MCV    Hypokalemia    Elevated d-dimer    DM2 (diabetes mellitus, type 2) (CMS/[facility])    Folate deficiency    Constipation          Plan:   (Pleasant 58yo gentleman with h/o TBI, recurrent UTIs, CAD, HTN, and DM2 who was admitted with substernal chest pain    He has ruled out for ACS with neg Trop x 4, CTA chest was negative for PE  He had normal nuclear stress test in September of last year  Card felt he may have pericarditis given changes on EKG, Echo is unremarkable,  inflammatory markers are high  Appreciate Card attention to pt, their notes now recommend adding Zithromax to Rocephin to treat potential community-acquired PNA . . . Dr. Tirado believes pt has a pleuritis due to mild parapneumonic effusions that aren't seen on CT, started Zithromax and also started IS, weaned off O2 now  DDimer was elevated, venous u/s of BLEs is negative for DVT  Continue Rocephin for possible UTI and Lotrisone for balanitis, WBC falling with current regimen, urine culture not helpful  Appreciate  attention to pt  MCV elevated, B12 normal and folate is low, replacing  DC'd IVFs  Replacing K+ orally  Sugars are acceptable, HgbA1c is good at 6.00  Start bowel regimen, check KUB to assess stool burden  DC'd morphine, Lortab low-dose started PRN, restart pt's home Gabapentin  ).       Darvin Rosales MD  08/15/19  4:48 PM    Time: 35min

## 2019-08-15 NOTE — PLAN OF CARE
Problem: Patient Care Overview  Goal: Plan of Care Review  Outcome: Ongoing (interventions implemented as appropriate)  Continued care as ordered

## 2019-08-15 NOTE — PROGRESS NOTES
Cardiology progress note  Christian Rome MD, PhD      Patient Care Team:  Heather Peraza APRN as PCP - General (Family Medicine)          CHIEF COMPLAINT: SOA/CP      SUBJECTIVE:  Per Dr. Tirado consult note:  Kd is a 59-year-old male patient who reportedly sees Dr. Kat at [facility] cardiology [facility].  He has multiple coronary artery risk factors including diabetes mellitus type II, dyslipidemia, hypertension and tobacco use.  He was admitted to [facility] for complaints of chest pain and shortness of breath.     We were consulted for his chest pain complaint and his reported cardiac history.  Of note his chest pain syndrome consists of inspiratory substernal chest pain rated at 5 out of 10 that radiates to his flanks is not worsened with exertion but is neither improved with lying flat or sitting upright or with rest.  It is been relatively chronic but again worsens with respiration.  He denies any associated fever or chills.  His dyspnea is chronic at rest and does worsen with exertion.  The above his evolved over the past 2 weeks.  He did have an elevated temperature of almost 100 during his hospital stay.  In addition he has a leukocytosis peaking at 19 and currently resolving to 11 today.  In the setting he was placed on ceftriaxone for what appears to be a urinary tract infection by urinalysis but not by clinical syndrome.  Of note I personally reviewed his chest x-ray from 8/11/2019 which shows small bilateral pleural effusions but no acute pulmonary process and no pulmonary edema.  I personally reviewed his ECG from this hospitalization which shows normal sinus rhythm with inferior T wave flattening otherwise nonspecific ST-T wave changes.  In addition I reviewed his echocardiogram images from 8/13/2018 which showed normal LV systolic function, normal RV systolic function, no significant valvular heart disease and no evidence of pericardial effusion.  So had no evidence of  elevated right-sided filling pressures.     He underwent cycling of his cardiac enzymes which showed negative troponins across the board.    ===================================================================  8/14 still complaints of CP with inspiration and use of IS, + shortness of breath as well but otherwise he remains on room air doing well today.  He has no significant peripheral edema but does have mild JVD with HJR on exam.  There are also very faint bilateral basilar crackles on exam and he is not currently on a loop diuretic.  I will give him 20 mg IV Lasix x1 today with normal renal function normal electrolytes.  Replace electrolytes as needed.  He will continue on antibiotics for pneumonia and UTI treatment.  No need for ischemic evaluation at this point.  ========================================================================  8/15 today, shortness of breath improved, single dose Lasix given yesterday with exam findings of left bibasilar crackles much improved, mild residual right base crackles on auscultation.  Does not appear dry presently, mild dizziness with standing but otherwise appears without pain presently no chest pain no syncope no palpitations no other complaints from cardiac standpoint today.  Telemetry tracing reviewed by me with sinus rhythm only presently 70s on my encounter with blood pressure 1 20-1 30 systolic.      REVIEW OF SYSTEMS: Some 14-point review of systems is negative except what is mentioned in the HPI relative to the current complaint.     PAST MEDICAL HISTORY:   Past Medical History:   Diagnosis Date   • Arthritis    • Asthma    • Concussion    • Coronary artery disease    • Diabetes mellitus (CMS/[facility])    • Diverticulitis    • Elevated cholesterol    • GERD (gastroesophageal reflux disease)    • Hyperlipidemia    • Hypertension    • Kidney stone    • Migraine    • TBI (traumatic brain injury) (CMS/[facility])    • Torn meniscus        ALLERGIES:   No Known Allergies      PAST  SURGICAL HISTORY:   Past Surgical History:   Procedure Laterality Date   • ABDOMINAL SURGERY     • CARDIAC CATHETERIZATION     • COLON RESECTION     • COLON SURGERY     • COLONOSCOPY     • ENDOSCOPY     • HERNIA REPAIR     • SKIN BIOPSY            FAMILY HISTORY:   Family History   Problem Relation Age of Onset   • Alcohol abuse Mother    • Arthritis Mother    • Asthma Mother    • Cancer Mother    • Hearing loss Mother    • Alcohol abuse Father    • Arthritis Father    • Diabetes Father    • Heart disease Father    • Hyperlipidemia Father    • Hypertension Father          SOCIAL HISTORY:   Social History     Socioeconomic History   • Marital status: Unknown     Spouse name: Not on file   • Number of children: Not on file   • Years of education: Not on file   • Highest education level: Not on file   Tobacco Use   • Smoking status: Former Smoker     Packs/day: 0.50     Years: 15.00     Pack years: 7.50     Types: Cigarettes     Start date:      Last attempt to quit:      Years since quittin.6   • Smokeless tobacco: Never Used   Substance and Sexual Activity   • Alcohol use: Yes     Alcohol/week: 0.6 - 1.8 oz     Types: 1 - 3 Glasses of wine per week     Comment: DRINKS 3-4X'S A WEEK   • Drug use: No   • Sexual activity: Defer     Birth control/protection: None       CURRENT MEDICATIONS:     Current Facility-Administered Medications:   •  acetaminophen (TYLENOL) tablet 650 mg, 650 mg, Oral, Q4H PRN, Dominik Grossman MD, 650 mg at 19 0955  •  aspirin EC tablet 81 mg, 81 mg, Oral, Daily, Gina Gutierrez, APRN, 81 mg at 08/15/19 0809  •  azithromycin (ZITHROMAX) 500 mg 0.9% NaCl (Add-vantage) 250 mL, 500 mg, Intravenous, Q24H, Darvin Rosales MD, 500 mg at 19 1838  •  budesonide-formoterol (SYMBICORT) 80-4.5 MCG/ACT inhaler 2 puff, 2 puff, Inhalation, BID - RT, Darvin Rosales MD, 2 puff at 08/15/19 0711  •  cefTRIAXone (ROCEPHIN) IVPB 1 g, 1 g, Intravenous, Q24H, Dominik Grossman MD, Last Rate:  100 mL/hr at 08/14/19 2135, 1 g at 08/14/19 2135  •  clotrimazole-betamethasone (LOTRISONE) 1-0.05 % cream, , Topical, Q12H, Dominik Grossman MD  •  dextrose (D50W) 25 g/ 50mL Intravenous Solution 25 g, 25 g, Intravenous, Q15 Min PRN, Dominik Grossman MD  •  dextrose (GLUTOSE) oral gel 15 g, 15 g, Oral, Q15 Min PRN, Dominik Grossman MD  •  folic acid (FOLVITE) tablet 1 mg, 1 mg, Oral, Daily, Darvin Rosales MD, 1 mg at 08/15/19 0809  •  glucagon (human recombinant) (GLUCAGEN DIAGNOSTIC) injection 1 mg, 1 mg, Subcutaneous, PRN, Dominik Grossman MD  •  HYDROcodone-acetaminophen (NORCO) 5-325 MG per tablet 1 tablet, 1 tablet, Oral, Q6H PRN, Darvin Rosales MD  •  insulin lispro (humaLOG) injection 0-9 Units, 0-9 Units, Subcutaneous, 4x Daily With Meals & Nightly, Dominik Grossman MD, 2 Units at 08/15/19 1125  •  nitroglycerin (NITROSTAT) SL tablet 0.4 mg, 0.4 mg, Sublingual, Q5 Min PRN, Dominik Grossman MD  •  ondansetron (ZOFRAN) injection 4 mg, 4 mg, Intravenous, Q6H PRN, Dominik Grossman MD  •  potassium chloride (KLOR-CON) packet 40 mEq, 40 mEq, Oral, PRN, Darvin Rosales MD  •  potassium chloride (MICRO-K) CR capsule 40 mEq, 40 mEq, Oral, PRN, Darvin Rosales MD  •  sodium chloride 0.9 % flush 10 mL, 10 mL, Intravenous, PRN, Niki Torres MD  •  sodium chloride 0.9 % flush 3 mL, 3 mL, Intravenous, Q12H, Dominik Grossman MD, 3 mL at 08/15/19 0810  •  sodium chloride 0.9 % flush 3-10 mL, 3-10 mL, Intravenous, PRN, Dominik Grossman MD      DIAGNOSTIC DATA:     I reviewed the patient's new clinical results.    Lab Results (last 24 hours)     Procedure Component Value Units Date/Time    POC Glucose Once [[id number]]  (Abnormal) Collected:  08/15/19 1113    Specimen:  Blood Updated:  08/15/19 1116     Glucose 192 mg/dL     POC Glucose Once [[id number]]  (Abnormal) Collected:  08/15/19 0647    Specimen:  Blood Updated:  08/15/19 0649     Glucose 152 mg/dL     POC Glucose Once [[id number]]  (Normal) Collected:  08/14/19 2043    Specimen:  Blood  Updated:  08/14/19 2045     Glucose 124 mg/dL     POC Glucose Once [[id number]]  (Abnormal) Collected:  08/14/19 1622    Specimen:  Blood Updated:  08/14/19 1623     Glucose 139 mg/dL           Imaging Results (last 24 hours)     ** No results found for the last 24 hours. **          Xray reviewed personally by physician.      ECG reviewed personally by physician  ECG/EMG Results (most recent)     Procedure Component Value Units Date/Time    ECG 12 Lead [[id number]] Collected:  08/11/19 1723     Updated:  08/12/19 1136    Narrative:       HEART RATE= 101  bpm  RR Interval= 596  ms  KY Interval= 194  ms  P Horizontal Axis= 264  deg  P Front Axis= 60  deg  QRSD Interval= 80  ms  QT Interval= 332  ms  QRS Axis= 13  deg  T Wave Axis= 28  deg  - ABNORMAL ECG -  Sinus tachycardia  Ventricular premature complex  Mild inferior ST elevations- consider inferior injury versus pericarditis  NO PRIOR TRACING AVAILABLE FOR COMPARISON  Electronically Signed By: Suzy Ramirez ([facility]) 12-Aug-2019 11:36:27  Date and Time of Study: 2019-08-11 17:23:39    ECG 12 Lead [[id number]] Collected:  08/13/19 0558     Updated:  08/13/19 0824    Narrative:       HEART RATE= 76  bpm  RR Interval= 792  ms  KY Interval= 222  ms  P Horizontal Axis= 11  deg  P Front Axis= 51  deg  QRSD Interval= 88  ms  QT Interval= 371  ms  QRS Axis= 51  deg  T Wave Axis= 5  deg  - ABNORMAL ECG -  Sinus rhythm  Prolonged KY interval  Low voltage, precordial leads  Non-specific STT wave change  ns st t changes new  Electronically Signed By: Tim [facility]) ([facility]) 13-Aug-2019 08:24:32  Date and Time of Study: 2019-08-13 05:58:13    Adult Transthoracic Echo Complete W/ Cont if Necessary Per Protocol [[id number]] Collected:  08/13/19 0733     Updated:  08/13/19 2312     BSA 2.5 m^2      IVSd 1.1 cm      LVIDd 4.8 cm      LVIDs 3.6 cm      LVPWd 1.1 cm      IVS/LVPW 1.0     FS 25.0 %      EDV(Teich) 107.5 ml      ESV(Teich) 54.4 ml      EF(Teich) 49.4 %      EDV(cubed)  110.6 ml      ESV(cubed) 46.7 ml      EF(cubed) 57.8 %      LV mass(C)d 194.0 grams      LV mass(C)dI 77.4 grams/m^2      SV(Teich) 53.1 ml      SI(Teich) 21.2 ml/m^2      SV(cubed) 63.9 ml      SI(cubed) 25.5 ml/m^2      Ao root diam 4.0 cm      Ao root area 12.6 cm^2      ACS 2.2 cm      asc Aorta Diam 3.7 cm      LVOT diam 2.2 cm      LVOT area 3.8 cm^2      LVOT area(traced) 3.8 cm^2      RVOT diam 3.1 cm      RVOT area 7.5 cm^2      LVLd ap4 10.2 cm      EDV(MOD-sp4) 133.0 ml      LVLs ap4 8.8 cm      ESV(MOD-sp4) 52.0 ml      EF(MOD-sp4) 60.9 %      LVLd ap2 9.3 cm      EDV(MOD-sp2) 119.0 ml      LVLs ap2 8.1 cm      ESV(MOD-sp2) 59.0 ml      EF(MOD-sp2) 50.4 %      SV(MOD-sp4) 81.0 ml      SI(MOD-sp4) 32.3 ml/m^2      SV(MOD-sp2) 60.0 ml      SI(MOD-sp2) 24.0 ml/m^2      Ao root area (BSA corrected) 1.6     LV Mag[place]on Vol (BSA corrected) 53.1 ml/m^2      LV Sys Vol (BSA corrected) 20.8 ml/m^2      MV A dur 0.14 sec      MV E max lisa 96.5 cm/sec      MV A max lisa 67.4 cm/sec      MV E/A 1.4     MV V2 max 95.7 cm/sec      MV max PG 3.7 mmHg      MV V2 mean 56.1 cm/sec      MV mean PG 2.0 mmHg      MV V2 VTI 28.9 cm      MVA(VTI) 2.7 cm^2      MV P1/2t max lisa 95.4 cm/sec      MV P1/2t 84.7 msec      MVA(P1/2t) 2.6 cm^2      MV dec slope 330.0 cm/sec^2      MV dec time 199 sec      Ao pk lisa 109.0 cm/sec      Ao max PG 4.8 mmHg      Ao max PG (full) 1.1 mmHg      Ao V2 mean 72.8 cm/sec      Ao mean PG 3.0 mmHg      Ao mean PG (full) 1.0 mmHg      Ao V2 VTI 23.8 cm      MICH(I,A) 3.3 cm^2      MICH(I,D) 3.3 cm^2      MICH(V,A) 3.3 cm^2      MICH(V,D) 3.3 cm^2      AI max lisa 410.0 cm/sec      AI max PG 67.2 mmHg      AI dec slope 183.5 cm/sec^2      AI P1/2t 654.4 msec      LV V1 max PG 3.6 mmHg      LV V1 mean PG 2.0 mmHg      LV V1 max 95.3 cm/sec      LV V1 mean 62.5 cm/sec      LV V1 VTI 20.5 cm      SV(Ao) 299.1 ml      SI(Ao) 119.4 ml/m^2      SV(LVOT) 77.9 ml      SV(RVOT) 101.9 ml      SI(LVOT) 31.1 ml/m^2       "PA V2 max 104.0 cm/sec      PA max PG 4.3 mmHg      PA max PG (full) 2.6 mmHg       CV ECHO GALILEA - PVA(V,A) 4.7 cm^2       CV ECHO GALILEA - PVA(V,D) 4.7 cm^2      PA acc time 0.11 sec      RV V1 max PG 1.7 mmHg      RV V1 mean PG 1.0 mmHg      RV V1 max 65.0 cm/sec      RV V1 mean 37.3 cm/sec      RV V1 VTI 13.5 cm      RAP systole 3.0 mmHg      PA pr(Accel) 30.4 mmHg      Qp/Qs 1.3     MVA P1/2T LCG 2.3 cm^2       CV ECHO GALILEA - BZI_BMI 34.1 kilograms/m^2       CV ECHO GALILEA - BSA(HAYCOCK) 2.6 m^2       CV ECHO GALILEA - BZI_METRIC_WEIGHT 123.8 kg       CV ECHO GALILEA - BZI_METRIC_HEIGHT 190.5 cm      Target HR (85%) 138 bpm      Max. Pred. HR (100%) 162 bpm       CV VAS BP RIGHT /69 mmHg      TDI S' 16.00 cm/sec      RV Base 3.27 cm      Dimensionless Index 0.9 (DI)      LA Volume Index 27.0 mL/m2      Avg E/e' ratio 9.90     EF(MOD-bp) 56.6 %      Lat Peak E' Gabe 10.8 cm/sec      Med Peak E' Gabe 8.70 cm/sec      TAPSE (>1.6) 2.50 cm2     Narrative:       · Left ventricular systolic function is normal. Calculated EF = 56.6%.  · Left ventricular wall thickness is consistent with borderline concentric   hypertrophy.  · Mild aortic valve regurgitation is present.  · Mild mitral valve regurgitation is present  · Mild tricuspid valve regurgitation is present.  · Mild pulmonic valve regurgitation is present.  · Mild dilation of the aortic root is present.               PHYSICAL EXAMINATION:  VITAL SIGNS: Temp:  [97.9 °F (36.6 °C)-98.7 °F (37.1 °C)] 98 °F (36.7 °C)  Heart Rate:  [43-90] 77  Resp:  [16-18] 16  BP: (127-151)/(80-85) 127/80   Flowsheet Rows      First Filed Value   Admission Height  182.9 cm (72\") Documented at 08/11/2019 1726   Admission Weight  120 kg (264 lb) Documented at 08/11/2019 1726        GENERAL: Alert and oriented x3, afebrile. Vital signs stable, appeared comfortable, nondistressed, and appears stated age. Generalized weakness. Responds to questions appropriately.   HEENT: " Normocephalic, atraumatic. Pupils equal, round and reactive to light. Extraocular movements intact bilaterally. Trachea midline.  Mucous membranes are moist.   NECK: Supple.  Mild HJR, mild JVD. Trachea midline, no LAD  CHEST: Clear to auscultation bilaterally anteriorly; mild right base  crackles on auscultation improved since yesterday without rhonchi, or wheezes  HEART: Regular rate and rhythm. No rubs, murmurs or gallops.,  No S3-S4 no heave no lift  ABDOMEN: Soft, nontender, nondistended. Bowel sounds are otherwise positive.   EXTREMITIES: No clubbing, cyanosis, or edema. Moves all extremities  SKIN: Warm and dry. No ecchymoses, petechiae or rashes.   MUSCULOSKELETAL: No bony abnormalities. Range of motion normal. No crepitus. No joint swelling or erythema.   NEUROLOGIC: Cranial nerves II-XII intact bilaterally. No focal neurologic deficits. Mini-Mental status exam was deferred.   LYMPHATICS: No lymphadenopathy.     ASSESSMENT AND PLAN:   • Chest pain  Chest pain atypical for angina.  More pleuritic in description,  echo normal and unremarkable, mild crackles on exam, continue treatment for pneumonia, He is on ceftriaxone and azithromycin.    Leukocytosis improving, defer noninvasive ischemic evaluation presently, non-decompensated no evidence of any unstable signs or symptoms.   • TBI (traumatic brain injury) (CMS/HCC)   • Hypertension  Being well controlled on medical therapy while in house.   • UTI (urinary tract infection)   • Coronary artery disease  Currently nonobstructive as per previous cardiac catheterization by patient report.  Assume clinically silent at this time.  No evidence of acute coronary syndrome and negative biomarkers.do not believe that anticoagulation is necessary at this time.  May consider an out stress testing , if conditions change will take for invasive ischemic evaluation if he develops any hemodynamic or electrical instability with signs of EKG changes or ongoing chest pain  refractory to medical therapy or evidenced by wall motion abnormality on echo.   • Hyperlipidemia   • Balanitis   • Elevated MCV   • Hypokalemia   • Elevated d-dimer   • DM2 (diabetes mellitus, type 2) (CMS/HCC)  A cardiac risk factor but not likely contributing to his presentation.     Pneumonia, per primary  -Clinical diagnosis at this time without significant radiographic confirmation.  Would tailor antibiotics for community-acquired pneumonia.  Will hold off on further cardiac work-up at this time.     No labs today, will watch today, no further Lasix today.  Recheck BMP in a.m., re-dose Lasix single dose if needed.  Appears euvolemic and improving.  Stable from cardiovascular standpoint.  Chest pain-free.  Can perform outpatient ischemic evaluation once discharged and follows up in clinic.  Invasive evaluation if he becomes unstable in the hospital with evidence of ischemia related to EKG changes, non-resolving chest pain or biomarker elevation.  None of these are currently present.    Thank you very much for the consult.  It is a pleasure to be involved in his cardiac care.  We will continue to follow please call with any questions or concerns  Christian Rome MD PhD      Christian Rome MD, PhD  08/15/19  1:26 PM      Time: Greater than 30 minutes was spent with the patient today on encounter with education greater 50% of time on diuretics, possible deferred ischemic work-up once systemically improved, ongoing treatment for infection with Rocephin and azithromycin, unchanged and unremarkable ECG as well as unremarkable troponin enzymes.

## 2019-08-15 NOTE — NURSING NOTE
Pt requested to switch from IV morphine to po percocet that he takes at home for pain to help transition when discharged. RN called and spoke with Janis Monte and she stated that we will wait until dayshift to switch pt to po percocet.

## 2019-08-15 NOTE — PLAN OF CARE
Problem: Patient Care Overview  Goal: Plan of Care Review   08/15/19 0403   Coping/Psychosocial   Plan of Care Reviewed With patient   Plan of Care Review   Progress improving   OTHER   Outcome Summary VSS. Persistent pain in head even after pain meds given. IV morphine supposed to be switched to PO meds to help transition pt for d/c. Pt very pleasant and no new complaints. Will continue to monitor closely.      Goal: Individualization and Mutuality  Outcome: Ongoing (interventions implemented as appropriate)    Goal: Discharge Needs Assessment  Outcome: Ongoing (interventions implemented as appropriate)    Goal: Interprofessional Rounds/Family Conf  Outcome: Ongoing (interventions implemented as appropriate)      Problem: Fall Risk (Adult)  Goal: Identify Related Risk Factors and Signs and Symptoms  Outcome: Ongoing (interventions implemented as appropriate)    Goal: Absence of Fall  Outcome: Ongoing (interventions implemented as appropriate)      Problem: Pain, Acute (Adult)  Goal: Identify Related Risk Factors and Signs and Symptoms  Outcome: Ongoing (interventions implemented as appropriate)    Goal: Acceptable Pain Control/Comfort Level  Outcome: Ongoing (interventions implemented as appropriate)      Problem: Skin Injury Risk (Adult)  Goal: Identify Related Risk Factors and Signs and Symptoms  Outcome: Ongoing (interventions implemented as appropriate)    Goal: Skin Health and Integrity  Outcome: Ongoing (interventions implemented as appropriate)

## 2019-08-16 LAB
ANION GAP SERPL CALCULATED.3IONS-SCNC: 11.8 MMOL/L (ref 5–15)
BUN BLD-MCNC: 12 MG/DL (ref 6–20)
BUN/CREAT SERPL: 16.9 (ref 7–25)
CALCIUM SPEC-SCNC: 9.6 MG/DL (ref 8.6–10.5)
CHLORIDE SERPL-SCNC: 99 MMOL/L (ref 98–107)
CO2 SERPL-SCNC: 24.2 MMOL/L (ref 22–29)
CREAT BLD-MCNC: 0.71 MG/DL (ref 0.76–1.27)
DEPRECATED RDW RBC AUTO: 49.1 FL (ref 37–54)
ERYTHROCYTE [DISTWIDTH] IN BLOOD BY AUTOMATED COUNT: 13 % (ref 12.3–15.4)
GFR SERPL CREATININE-BSD FRML MDRD: 138 ML/MIN/1.73
GLUCOSE BLD-MCNC: 227 MG/DL (ref 65–99)
GLUCOSE BLDC GLUCOMTR-MCNC: 139 MG/DL (ref 70–130)
GLUCOSE BLDC GLUCOMTR-MCNC: 144 MG/DL (ref 70–130)
GLUCOSE BLDC GLUCOMTR-MCNC: 162 MG/DL (ref 70–130)
GLUCOSE BLDC GLUCOMTR-MCNC: 191 MG/DL (ref 70–130)
HCT VFR BLD AUTO: 40.1 % (ref 37.5–51)
HGB BLD-MCNC: 13.6 G/DL (ref 13–17.7)
MCH RBC QN AUTO: 34.8 PG (ref 26.6–33)
MCHC RBC AUTO-ENTMCNC: 33.9 G/DL (ref 31.5–35.7)
MCV RBC AUTO: 102.6 FL (ref 79–97)
PLATELET # BLD AUTO: 383 10*3/MM3 (ref 140–450)
PMV BLD AUTO: 9.4 FL (ref 6–12)
POTASSIUM BLD-SCNC: 3.6 MMOL/L (ref 3.5–5.2)
RBC # BLD AUTO: 3.91 10*6/MM3 (ref 4.14–5.8)
SODIUM BLD-SCNC: 135 MMOL/L (ref 136–145)
WBC NRBC COR # BLD: 11.29 10*3/MM3 (ref 3.4–10.8)

## 2019-08-16 PROCEDURE — 63710000001 INSULIN LISPRO (HUMAN) PER 5 UNITS: Performed by: INTERNAL MEDICINE

## 2019-08-16 PROCEDURE — 80048 BASIC METABOLIC PNL TOTAL CA: CPT | Performed by: INTERNAL MEDICINE

## 2019-08-16 PROCEDURE — 99231 SBSQ HOSP IP/OBS SF/LOW 25: CPT | Performed by: INTERNAL MEDICINE

## 2019-08-16 PROCEDURE — 25010000002 CEFTRIAXONE PER 250 MG: Performed by: INTERNAL MEDICINE

## 2019-08-16 PROCEDURE — 94799 UNLISTED PULMONARY SVC/PX: CPT

## 2019-08-16 PROCEDURE — 85027 COMPLETE CBC AUTOMATED: CPT | Performed by: HOSPITALIST

## 2019-08-16 PROCEDURE — 82962 GLUCOSE BLOOD TEST: CPT

## 2019-08-16 RX ORDER — BISACODYL 10 MG
10 SUPPOSITORY, RECTAL RECTAL ONCE
Status: COMPLETED | OUTPATIENT
Start: 2019-08-16 | End: 2019-08-16

## 2019-08-16 RX ADMIN — GABAPENTIN 300 MG: 300 CAPSULE ORAL at 14:33

## 2019-08-16 RX ADMIN — GABAPENTIN 300 MG: 300 CAPSULE ORAL at 22:13

## 2019-08-16 RX ADMIN — FOLIC ACID 1 MG: 1 TABLET ORAL at 08:39

## 2019-08-16 RX ADMIN — INSULIN LISPRO 2 UNITS: 100 INJECTION, SOLUTION INTRAVENOUS; SUBCUTANEOUS at 08:39

## 2019-08-16 RX ADMIN — BUDESONIDE AND FORMOTEROL FUMARATE DIHYDRATE 2 PUFF: 80; 4.5 AEROSOL RESPIRATORY (INHALATION) at 07:30

## 2019-08-16 RX ADMIN — SENNOSIDES AND DOCUSATE SODIUM 2 TABLET: 8.6; 5 TABLET ORAL at 20:16

## 2019-08-16 RX ADMIN — CLOTRIMAZOLE AND BETAMETHASONE DIPROPIONATE: 10; .5 CREAM TOPICAL at 09:57

## 2019-08-16 RX ADMIN — BISACODYL 10 MG: 10 SUPPOSITORY RECTAL at 15:56

## 2019-08-16 RX ADMIN — GABAPENTIN 300 MG: 300 CAPSULE ORAL at 06:21

## 2019-08-16 RX ADMIN — CEFTRIAXONE SODIUM 1 G: 1 INJECTION, SOLUTION INTRAVENOUS at 20:16

## 2019-08-16 RX ADMIN — BUDESONIDE AND FORMOTEROL FUMARATE DIHYDRATE 2 PUFF: 80; 4.5 AEROSOL RESPIRATORY (INHALATION) at 19:44

## 2019-08-16 RX ADMIN — POLYETHYLENE GLYCOL 3350 17 G: 17 POWDER, FOR SOLUTION ORAL at 08:39

## 2019-08-16 RX ADMIN — CLOTRIMAZOLE AND BETAMETHASONE DIPROPIONATE 1 APPLICATION: 10; .5 CREAM TOPICAL at 22:13

## 2019-08-16 RX ADMIN — HYDROCODONE BITARTRATE AND ACETAMINOPHEN 1 TABLET: 5; 325 TABLET ORAL at 09:57

## 2019-08-16 RX ADMIN — SODIUM CHLORIDE, PRESERVATIVE FREE 3 ML: 5 INJECTION INTRAVENOUS at 22:14

## 2019-08-16 RX ADMIN — HYDROCODONE BITARTRATE AND ACETAMINOPHEN 1 TABLET: 5; 325 TABLET ORAL at 22:13

## 2019-08-16 RX ADMIN — ASPIRIN 81 MG: 81 TABLET, COATED ORAL at 08:39

## 2019-08-16 RX ADMIN — SODIUM CHLORIDE, PRESERVATIVE FREE 3 ML: 5 INJECTION INTRAVENOUS at 08:40

## 2019-08-16 NOTE — PROGRESS NOTES
[facility]        LOS:  LOS: 4 days   Patient Name: Kd Abraham  Age/Sex: 59 y.o. male  : 1960  MRN: [id number]    Day of Service: 19   Length of Stay: 4  Encounter Provider: Christian Rome MD, PhD  Place of Service: [facility] CARDIOLOGY  Patient Care Team:  Heather Peraza APRN as PCP - General (Family Medicine)    Subjective:     Chief Complaint:  F/U CP    Subjective:   Minimal chest pain.  Denies SOA.  Wants to go home. No CP, SOA improved.     Current Medications:   Scheduled Meds:  aspirin 81 mg Oral Daily   budesonide-formoterol 2 puff Inhalation BID - RT   ceftriaxone 1 g Intravenous Q24H   clotrimazole-betamethasone  Topical Q12H   folic acid 1 mg Oral Daily   gabapentin 300 mg Oral Q8H   insulin lispro 0-9 Units Subcutaneous 4x Daily With Meals & Nightly   polyethylene glycol 17 g Oral Daily   sennosides-docusate sodium 2 tablet Oral Nightly   sodium chloride 3 mL Intravenous Q12H     Continuous Infusions:     Allergies:  No Known Allergies    ROS x 12 systems unremarkabel    Objective:     Temp:  [97.4 °F (36.3 °C)-98.1 °F (36.7 °C)] 97.4 °F (36.3 °C)  Heart Rate:  [71-96] 87  Resp:  [16-18] 16  BP: (126-139)/(73-87) 139/80     Intake/Output Summary (Last 24 hours) at 2019 0936  Last data filed at 2019 0610  Gross per 24 hour   Intake 120 ml   Output 1175 ml   Net -1055 ml     Body mass index is 34.17 kg/m².      19  1726 19  2210   Weight: 120 kg (264 lb) 124 kg (273 lb 6.4 oz)         Physical Exam:  Neuro:  CV:  Resp:  GI:  Ext:  Tele: AAOx3  Distant S1S2 RRR  Non-labored, CTA  BS+, abd soft  Pedal palp, trace non-pitting BLE edema  SR                                                   Lab Review:   Results from last 7 days   Lab Units 19  0353 19  0410 19  0514 19  0421   SODIUM mmol/L 135* 136 136 137   POTASSIUM mmol/L 3.6 3.5 3.7 3.3*   CHLORIDE mmol/L 99 100 99 99   CO2 mmol/L 24.2  22.1 20.5* 23.7   BUN mg/dL 12 8 7 9   CREATININE mg/dL 0.71* 0.57* 0.55* 0.56*   GLUCOSE mg/dL 227* 204* 119* 165*   CALCIUM mg/dL 9.6 8.6 9.0 8.6   AST (SGOT) U/L  --   --  11 10   ALT (SGPT) U/L  --   --  6 6     Results from last 7 days   Lab Units 08/12/19  1023 08/12/19  0421 08/11/19  2341 08/11/19  1730   TROPONIN T ng/mL <0.010 <0.010 <0.010 <0.010     Results from last 7 days   Lab Units 08/16/19  0354 08/14/19  0410   WBC 10*3/mm3 11.29* 10.48   HEMOGLOBIN g/dL 13.6 13.4   HEMATOCRIT % 40.1 39.9   PLATELETS 10*3/mm3 383 335     Results from last 7 days   Lab Units 08/11/19  1730   INR  1.10     Results from last 7 days   Lab Units 08/13/19  0514 08/12/19  0421   MAGNESIUM mg/dL 1.9 1.7           Invalid input(s): LDLCALC            Recent Radiology:  Imaging Results (most recent)     Procedure Component Value Units Date/Time    XR Abdomen KUB [[id number]] Collected:  08/15/19 1902     Updated:  08/15/19 1906    Narrative:       Abdominal radiographs     HISTORY:Constipation     TECHNIQUE:  2 AP supine radiographs of the abdomen     COMPARISON:CT abdomen and pelvis 08/11/2019       Impression:       FINDINGS AND IMPRESSION:  The visualized bowel gas pattern is nonobstructive. The right lateral  aspect of the abdomen is collimated outside the field-of-view.     A moderate to large amount stool is present in the colon.     Small right pleural effusion is suspected.     This report was finalized on 8/15/2019 7:03 PM by Dr. Alfredo Feldman M.D.       CT Angiogram Chest With Contrast [[id number]] Collected:  08/11/19 1911     Updated:  08/11/19 1926    Narrative:       CT ANGIOGRAM THORAX WITH CONTRAST, PULMONARY EMBOLISM PROTOCOL     HISTORY: Pulmonary embolism.     COMPARISON: None.     TECHNIQUE: Radiation dose reduction techniques were utilized, including  automated exposure control and exposure modulation based on body size.  Axial contrast-enhanced images of the chest were obtained according to  the  pulmonary embolism protocol. Coronal oblique 3-D MIP reformatted  images were supplemented and reviewed.  100 mls of non ionic contrast  was utilized intravenously.     FINDINGS CHEST CT: Minimal bullous and fibrous changes are noted in the  lung apices, right greater than left. Dependent densities in the lower  lobes, right greater than left favored to be related to atelectasis  rather than pneumonia. There is no convincing evidence of active air  space disease process otherwise. IV contrast bolus is less than optimal  for pulmonary embolism assessment. There is no central or large proximal  embolus. There are no secondary signs of PE in the parenchyma. Aorta  mildly ectatic but nonaneurysmal. Mild cardiomegaly.             Impression:       1. Dependent lower lobe opacities are favored to be related to areas of  atelectasis, no active airspace disease.  2. Suboptimal IV contrast bolus but no central or large proximal PE  demonstrated.                    CT SCANS ABDOMEN AND PELVIS WITH IV CONTRAST.     HISTORY: eval for PE     COMPARISON: None.     TECHNIQUE: Radiation dose reduction techniques were utilized, including  automated exposure control and exposure modulation based on body size.  Axial images were obtained from the lung bases to the symphysis pubis  with IV contrast only.. Oral contrast was not administered per request.     FINDINGS :  There are a couple of subcentimeter renal lesions  bilaterally. The lesions are too small for accurate or detailed  assessment. Small cysts are favored, particularly if there is no history  of malignancy.  Suggest follow-up to ensure stability. Remaining solid  organs have an unremarkable appearance. Normal aorta and appendix. Mild  diverticulosis. Mild constipation. The GI tract not opacified for  assessment but non obstructive in appearance. There is a fat-containing  ventral hernia to the left of midline without associated inflammatory  change.           IMPRESSION :    1. Tiny renal lesions as discussed.  2. Constipation, diverticulosis, no obstruction or acute inflammation        This report was finalized on 8/11/2019 7:22 PM by Christian Hutson M.D.       CT Abdomen Pelvis With Contrast [[id number]] Collected:  08/11/19 1911     Updated:  08/11/19 1926    Narrative:       CT ANGIOGRAM THORAX WITH CONTRAST, PULMONARY EMBOLISM PROTOCOL     HISTORY: Pulmonary embolism.     COMPARISON: None.     TECHNIQUE: Radiation dose reduction techniques were utilized, including  automated exposure control and exposure modulation based on body size.  Axial contrast-enhanced images of the chest were obtained according to  the pulmonary embolism protocol. Coronal oblique 3-D MIP reformatted  images were supplemented and reviewed.  100 mls of non ionic contrast  was utilized intravenously.     FINDINGS CHEST CT: Minimal bullous and fibrous changes are noted in the  lung apices, right greater than left. Dependent densities in the lower  lobes, right greater than left favored to be related to atelectasis  rather than pneumonia. There is no convincing evidence of active air  space disease process otherwise. IV contrast bolus is less than optimal  for pulmonary embolism assessment. There is no central or large proximal  embolus. There are no secondary signs of PE in the parenchyma. Aorta  mildly ectatic but nonaneurysmal. Mild cardiomegaly.             Impression:       1. Dependent lower lobe opacities are favored to be related to areas of  atelectasis, no active airspace disease.  2. Suboptimal IV contrast bolus but no central or large proximal PE  demonstrated.                    CT SCANS ABDOMEN AND PELVIS WITH IV CONTRAST.     HISTORY: eval for PE     COMPARISON: None.     TECHNIQUE: Radiation dose reduction techniques were utilized, including  automated exposure control and exposure modulation based on body size.  Axial images were obtained from the lung bases to the symphysis pubis  with IV  contrast only.. Oral contrast was not administered per request.     FINDINGS :  There are a couple of subcentimeter renal lesions  bilaterally. The lesions are too small for accurate or detailed  assessment. Small cysts are favored, particularly if there is no history  of malignancy.  Suggest follow-up to ensure stability. Remaining solid  organs have an unremarkable appearance. Normal aorta and appendix. Mild  diverticulosis. Mild constipation. The GI tract not opacified for  assessment but non obstructive in appearance. There is a fat-containing  ventral hernia to the left of midline without associated inflammatory  change.           IMPRESSION :   1. Tiny renal lesions as discussed.  2. Constipation, diverticulosis, no obstruction or acute inflammation        This report was finalized on 8/11/2019 7:22 PM by Christian Hutson M.D.       XR Chest 2 View [[id number]] Collected:  08/11/19 1804     Updated:  08/11/19 1809    Narrative:       PA AND LATERAL CHEST     CLINICAL HISTORY: Chest pain     The lungs are fairly well-expanded and appear free of focal infiltrates  or masses. There is no pneumothorax. Tiny bilateral pleural effusions  are evident. The heart is within normal limits in size. The pulmonary  vasculature is within normal limits.     IMPRESSIONS: Tiny bilateral pleural effusions. Otherwise unremarkable  chest x-ray.     This report was finalized on 8/11/2019 6:06 PM by Dr. Iván Tang M.D.             ECHOCARDIOGRAM:    Results for orders placed during the hospital encounter of 08/11/19   Adult Transthoracic Echo Complete W/ Cont if Necessary Per Protocol    Narrative · Left ventricular systolic function is normal. Calculated EF = 56.6%.  · Left ventricular wall thickness is consistent with borderline concentric   hypertrophy.  · Mild aortic valve regurgitation is present.  · Mild mitral valve regurgitation is present  · Mild tricuspid valve regurgitation is present.  · Mild pulmonic valve  regurgitation is present.  · Mild dilation of the aortic root is present.            I reviewed the patient's new clinical results.    EKG:      Assessment:       Chest pain    TBI (traumatic brain injury) (CMS/HCC)    Hypertension    UTI (urinary tract infection)    Coronary artery disease    Hyperlipidemia    Balanitis    Elevated MCV    Hypokalemia    Elevated d-dimer    DM2 (diabetes mellitus, type 2) (CMS/HCC)    Folate deficiency    Constipation      1) PNA  - per primary team    2) Atypical CP, abnormal EKG  - negative CE x 3 sets  - 2D echo 8/13/19 shows EF = 56% with mild AI/MR/TR  - CTA chest negative for PE  - outpt stress if needed on f/u     3) Mild non-obstructive CAD  - per cath 9/3013 at [facility] (report unavailable)  - normal NST at  9/2018  - 2D echo 12/2018 at  showed EF = 66% without significant VHD  - continue aspirin     5) HTN  - controlled     6) HLD      7) DM  - Hgb A1c = 6.0      Plan:   Pt appears euvolemic and CV stable.  No further cardiac work-up planned as per Dr. Rome.  F/U with cards in 2-4 weeks, ok for d/c from our standpoint, will follow peripherally over weekend if hes still here, exam improved with resolution of crackles completely.         Christian Rome MD, PhD.

## 2019-08-16 NOTE — PLAN OF CARE
Problem: Patient Care Overview  Goal: Plan of Care Review  Outcome: Ongoing (interventions implemented as appropriate)   08/15/19 0403 08/15/19 1741 08/16/19 1300   Coping/Psychosocial   Plan of Care Reviewed With --  --  patient   Plan of Care Review   Progress improving --  --    OTHER   Outcome Summary --  pain medication switched to PO, bowel regimen started for constipation, KUB, intermittent c/o headache, iv abx, vss, will continue to monitor  --      Goal: Individualization and Mutuality  Outcome: Ongoing (interventions implemented as appropriate)    Goal: Discharge Needs Assessment  Outcome: Ongoing (interventions implemented as appropriate)    Goal: Interprofessional Rounds/Family Conf  Outcome: Ongoing (interventions implemented as appropriate)      Problem: Fall Risk (Adult)  Goal: Identify Related Risk Factors and Signs and Symptoms  Outcome: Ongoing (interventions implemented as appropriate)    Goal: Absence of Fall  Outcome: Ongoing (interventions implemented as appropriate)      Problem: Pain, Acute (Adult)  Goal: Identify Related Risk Factors and Signs and Symptoms  Outcome: Ongoing (interventions implemented as appropriate)    Goal: Acceptable Pain Control/Comfort Level  Outcome: Ongoing (interventions implemented as appropriate)      Problem: Skin Injury Risk (Adult)  Goal: Identify Related Risk Factors and Signs and Symptoms  Outcome: Ongoing (interventions implemented as appropriate)    Goal: Skin Health and Integrity  Outcome: Ongoing (interventions implemented as appropriate)

## 2019-08-16 NOTE — PROGRESS NOTES
"   LOS: 4 days   Patient Care Team:  Heather Peraza APRN as PCP - General (Family Medicine)    Chief Complaint: constipation    Subjective     Feeling okay today. Still no BM. No chest pain or SOA.        Subjective:  Symptoms:  Improved.  He reports headache.  No shortness of breath, malaise, cough, chest pain, weakness, chest pressure, anorexia, diarrhea or anxiety.    Diet:  Adequate intake.  No nausea or vomiting.    Activity level: Impaired due to pain.    Pain:  He reports no pain.        History taken from: patient chart RN    Objective     Vital Signs  Temp:  [97.4 °F (36.3 °C)-98.1 °F (36.7 °C)] 97.4 °F (36.3 °C)  Heart Rate:  [71-96] 87  Resp:  [16-18] 16  BP: (126-139)/(73-87) 139/80    Objective:  General Appearance:  Comfortable and in no acute distress (walking in arriaga without need of assistance).    Vital signs: (most recent): Blood pressure 139/80, pulse 87, temperature 97.4 °F (36.3 °C), temperature source Oral, resp. rate 16, height 190.5 cm (75\"), weight 124 kg (273 lb 6.4 oz), SpO2 95 %.  Vital signs are normal.  No fever.    Output: Producing urine (balanitis noted) and no stool output.    HEENT: Normal HEENT exam.    Lungs:  Normal effort and normal respiratory rate.  Breath sounds clear to auscultation.    Heart: Normal rate.  Regular rhythm.    Chest: Symmetric chest wall expansion. No chest wall tenderness.    Abdomen: Abdomen is soft.  Bowel sounds are normal.   There is no abdominal tenderness.     Extremities: There is no dependent edema.  (SCDs in place)  Pulses: Distal pulses are intact.    Neurological: Patient is alert and oriented to person, place and time.    Skin:  Warm and dry.  No rash.             Results Review:     I reviewed the patient's new clinical results.  I reviewed the patient's new imaging results and agree with the interpretation.  I reviewed the patient's other test results and agree with the interpretation  I personally viewed and interpreted the patient's " EKG/Telemetry data  Discussed with pt and RN and CCP    Medication Review: reviewed and adjusted    Assessment/Plan       Chest pain    TBI (traumatic brain injury) (CMS/[facility])    Hypertension    UTI (urinary tract infection)    Coronary artery disease    Hyperlipidemia    Balanitis    Elevated MCV    Hypokalemia    Elevated d-dimer    DM2 (diabetes mellitus, type 2) (CMS/[facility])    Folate deficiency    Constipation          Plan:   (Pleasant 60yo gentleman with h/o TBI, recurrent UTIs, CAD, HTN, and DM2 who was admitted with substernal chest pain    He has ruled out for ACS with neg Trop x 4, CTA chest was negative for PE  He had normal nuclear stress test in September of last year  Card felt he may have pericarditis given changes on EKG, Echo is unremarkable,  inflammatory markers are high  Appreciate Card attention to pt, their notes now recommend adding Zithromax to Rocephin to treat potential community-acquired PNA . . . Dr. Tirado believes pt has a pleuritis due to mild parapneumonic effusions that aren't seen on CT, started Zithromax and also started IS, weaned off O2 now  DDimer was elevated, venous u/s of BLEs is negative for DVT  Continue Rocephin for possible UTI and Lotrisone for balanitis, WBC falling with current regimen, urine culture not helpful  Appreciate  attention to pt  MCV elevated, B12 normal and folate is low, replacing  DC'd IVFs  Replacing K+ orally  Sugars are acceptable, HgbA1c is good at 6.00  Start bowel regimen, checked KUB and pt has significant stool burden, have ordered Dulcolax suppository, if that doesn't work then can try Fleet enema  DC'd morphine, Lortab low-dose started PRN, restarted pt's home Gabapentin    Dispo: home tomorrow if moving bowels. Should not need any more abx after discharge  ).       Darvin Rosales MD  08/16/19  1:15 PM    Time: 20min

## 2019-08-17 PROBLEM — L30.9 FACIAL DERMATITIS: Chronic | Status: ACTIVE | Noted: 2019-08-17

## 2019-08-17 LAB
ANION GAP SERPL CALCULATED.3IONS-SCNC: 15 MMOL/L (ref 5–15)
BUN BLD-MCNC: 11 MG/DL (ref 6–20)
BUN/CREAT SERPL: 16.7 (ref 7–25)
CALCIUM SPEC-SCNC: 10.3 MG/DL (ref 8.6–10.5)
CHLORIDE SERPL-SCNC: 99 MMOL/L (ref 98–107)
CO2 SERPL-SCNC: 22 MMOL/L (ref 22–29)
CREAT BLD-MCNC: 0.66 MG/DL (ref 0.76–1.27)
DEPRECATED RDW RBC AUTO: 49.1 FL (ref 37–54)
ERYTHROCYTE [DISTWIDTH] IN BLOOD BY AUTOMATED COUNT: 13.2 % (ref 12.3–15.4)
GFR SERPL CREATININE-BSD FRML MDRD: 150 ML/MIN/1.73
GLUCOSE BLD-MCNC: 201 MG/DL (ref 65–99)
GLUCOSE BLDC GLUCOMTR-MCNC: 150 MG/DL (ref 70–130)
GLUCOSE BLDC GLUCOMTR-MCNC: 162 MG/DL (ref 70–130)
GLUCOSE BLDC GLUCOMTR-MCNC: 178 MG/DL (ref 70–130)
GLUCOSE BLDC GLUCOMTR-MCNC: 220 MG/DL (ref 70–130)
HCT VFR BLD AUTO: 44 % (ref 37.5–51)
HGB BLD-MCNC: 14.6 G/DL (ref 13–17.7)
MCH RBC QN AUTO: 33.6 PG (ref 26.6–33)
MCHC RBC AUTO-ENTMCNC: 33.2 G/DL (ref 31.5–35.7)
MCV RBC AUTO: 101.1 FL (ref 79–97)
PLATELET # BLD AUTO: 438 10*3/MM3 (ref 140–450)
PMV BLD AUTO: 9.4 FL (ref 6–12)
POTASSIUM BLD-SCNC: 4 MMOL/L (ref 3.5–5.2)
RBC # BLD AUTO: 4.35 10*6/MM3 (ref 4.14–5.8)
SODIUM BLD-SCNC: 136 MMOL/L (ref 136–145)
WBC NRBC COR # BLD: 12.92 10*3/MM3 (ref 3.4–10.8)

## 2019-08-17 PROCEDURE — 63710000001 INSULIN LISPRO (HUMAN) PER 5 UNITS: Performed by: INTERNAL MEDICINE

## 2019-08-17 PROCEDURE — 94799 UNLISTED PULMONARY SVC/PX: CPT

## 2019-08-17 PROCEDURE — 25010000002 CEFTRIAXONE PER 250 MG: Performed by: INTERNAL MEDICINE

## 2019-08-17 PROCEDURE — 85027 COMPLETE CBC AUTOMATED: CPT | Performed by: HOSPITALIST

## 2019-08-17 PROCEDURE — 82962 GLUCOSE BLOOD TEST: CPT

## 2019-08-17 PROCEDURE — 80048 BASIC METABOLIC PNL TOTAL CA: CPT | Performed by: HOSPITALIST

## 2019-08-17 RX ORDER — MAGNESIUM CARB/ALUMINUM HYDROX 105-160MG
296 TABLET,CHEWABLE ORAL ONCE
Status: COMPLETED | OUTPATIENT
Start: 2019-08-17 | End: 2019-08-17

## 2019-08-17 RX ORDER — DIAPER,BRIEF,INFANT-TODD,DISP
1 EACH MISCELLANEOUS EVERY 12 HOURS SCHEDULED
Status: DISCONTINUED | OUTPATIENT
Start: 2019-08-17 | End: 2019-08-18 | Stop reason: HOSPADM

## 2019-08-17 RX ADMIN — BUDESONIDE AND FORMOTEROL FUMARATE DIHYDRATE 2 PUFF: 80; 4.5 AEROSOL RESPIRATORY (INHALATION) at 19:17

## 2019-08-17 RX ADMIN — INSULIN LISPRO 2 UNITS: 100 INJECTION, SOLUTION INTRAVENOUS; SUBCUTANEOUS at 17:19

## 2019-08-17 RX ADMIN — CEFTRIAXONE SODIUM 1 G: 1 INJECTION, SOLUTION INTRAVENOUS at 20:27

## 2019-08-17 RX ADMIN — FOLIC ACID 1 MG: 1 TABLET ORAL at 08:29

## 2019-08-17 RX ADMIN — SODIUM CHLORIDE, PRESERVATIVE FREE 3 ML: 5 INJECTION INTRAVENOUS at 20:29

## 2019-08-17 RX ADMIN — INSULIN LISPRO 4 UNITS: 100 INJECTION, SOLUTION INTRAVENOUS; SUBCUTANEOUS at 13:12

## 2019-08-17 RX ADMIN — GABAPENTIN 300 MG: 300 CAPSULE ORAL at 21:45

## 2019-08-17 RX ADMIN — HYDROCORTISONE 1 APPLICATION: 1 CREAM TOPICAL at 20:27

## 2019-08-17 RX ADMIN — CLOTRIMAZOLE AND BETAMETHASONE DIPROPIONATE: 10; .5 CREAM TOPICAL at 20:27

## 2019-08-17 RX ADMIN — SODIUM CHLORIDE, PRESERVATIVE FREE 3 ML: 5 INJECTION INTRAVENOUS at 08:35

## 2019-08-17 RX ADMIN — ASPIRIN 81 MG: 81 TABLET, COATED ORAL at 08:29

## 2019-08-17 RX ADMIN — POLYETHYLENE GLYCOL 3350 17 G: 17 POWDER, FOR SOLUTION ORAL at 08:29

## 2019-08-17 RX ADMIN — SENNOSIDES AND DOCUSATE SODIUM 2 TABLET: 8.6; 5 TABLET ORAL at 20:27

## 2019-08-17 RX ADMIN — HYDROCORTISONE 1 APPLICATION: 1 CREAM TOPICAL at 17:19

## 2019-08-17 RX ADMIN — Medication 296 ML: at 13:17

## 2019-08-17 RX ADMIN — INSULIN LISPRO 2 UNITS: 100 INJECTION, SOLUTION INTRAVENOUS; SUBCUTANEOUS at 08:29

## 2019-08-17 RX ADMIN — GABAPENTIN 300 MG: 300 CAPSULE ORAL at 06:35

## 2019-08-17 RX ADMIN — CLOTRIMAZOLE AND BETAMETHASONE DIPROPIONATE: 10; .5 CREAM TOPICAL at 08:29

## 2019-08-17 RX ADMIN — BUDESONIDE AND FORMOTEROL FUMARATE DIHYDRATE 2 PUFF: 80; 4.5 AEROSOL RESPIRATORY (INHALATION) at 09:20

## 2019-08-17 RX ADMIN — GABAPENTIN 300 MG: 300 CAPSULE ORAL at 13:12

## 2019-08-17 NOTE — PLAN OF CARE
Problem: Patient Care Overview  Goal: Plan of Care Review  Outcome: Ongoing (interventions implemented as appropriate)   08/17/19 1535   Coping/Psychosocial   Plan of Care Reviewed With patient   OTHER   Outcome Summary no significant changes, facial redness new, mag citrate given and has had multiple BMs, no c/o pain or discomfort, possible d/c tomorrow, vss, will continue to monitor      Goal: Individualization and Mutuality  Outcome: Ongoing (interventions implemented as appropriate)    Goal: Discharge Needs Assessment  Outcome: Ongoing (interventions implemented as appropriate)    Goal: Interprofessional Rounds/Family Conf  Outcome: Ongoing (interventions implemented as appropriate)      Problem: Fall Risk (Adult)  Goal: Identify Related Risk Factors and Signs and Symptoms  Outcome: Ongoing (interventions implemented as appropriate)    Goal: Absence of Fall  Outcome: Ongoing (interventions implemented as appropriate)      Problem: Pain, Acute (Adult)  Goal: Identify Related Risk Factors and Signs and Symptoms  Outcome: Ongoing (interventions implemented as appropriate)    Goal: Acceptable Pain Control/Comfort Level  Outcome: Ongoing (interventions implemented as appropriate)      Problem: Skin Injury Risk (Adult)  Goal: Identify Related Risk Factors and Signs and Symptoms  Outcome: Ongoing (interventions implemented as appropriate)    Goal: Skin Health and Integrity  Outcome: Ongoing (interventions implemented as appropriate)

## 2019-08-17 NOTE — PROGRESS NOTES
"   LOS: 5 days   Patient Care Team:  Heather Peraza APRN as PCP - General (Family Medicine)    Chief Complaint: constipation    Subjective     Feeling okay today. Up walking around without SOA. Voiding well. Penis is much better. Still no significant BM. C/o rash on face today. Says he gets from time to time and his PCP has rx'd him topical steroid cream.         Subjective:  Symptoms:  Improved.  No shortness of breath, malaise, cough, chest pain, weakness, headache, chest pressure, anorexia, diarrhea or anxiety.    Diet:  Adequate intake.  No nausea or vomiting.    Activity level: Impaired due to pain.    Pain:  He reports no pain.        History taken from: patient chart RN    Objective     Vital Signs  Temp:  [98 °F (36.7 °C)-98.5 °F (36.9 °C)] 98.3 °F (36.8 °C)  Heart Rate:  [] 79  Resp:  [16-18] 18  BP: (139-151)/(87-93) 139/92    Objective:  General Appearance:  Comfortable and in no acute distress (walking in [place] without need of assistance).    Vital signs: (most recent): Blood pressure 139/92, pulse 79, temperature 98.3 °F (36.8 °C), temperature source Oral, resp. rate 18, height 190.5 cm (75\"), weight 124 kg (273 lb 6.4 oz), SpO2 93 %.  Vital signs are normal.  No fever.    Output: Producing urine (balanitis noted) and no stool output.    HEENT: Normal HEENT exam.    Lungs:  Normal effort and normal respiratory rate.  Breath sounds clear to auscultation.    Heart: Normal rate.  Regular rhythm.    Chest: Symmetric chest wall expansion. No chest wall tenderness.    Abdomen: Abdomen is soft.  Bowel sounds are normal.   There is no abdominal tenderness.     Extremities: There is no dependent edema.  (SCDs in place)  Pulses: Distal pulses are intact.    Neurological: Patient is alert and oriented to person, place and time.    Skin:  Warm and dry.  There is a rash.  Cyanosis: erythema of cheeks, symmetric.              Results Review:     I reviewed the patient's new clinical results.  I reviewed " the patient's other test results and agree with the interpretation  Discussed with pt and RN    Medication Review: reviewed and adjusted    Assessment/Plan       Chest pain    TBI (traumatic brain injury) (CMS/[facility])    Hypertension    UTI (urinary tract infection)    Coronary artery disease    Hyperlipidemia    Balanitis    Elevated MCV    Hypokalemia    Elevated d-dimer    DM2 (diabetes mellitus, type 2) (CMS/[facility])    Folate deficiency    Constipation    Facial dermatitis          Plan:   (Pleasant 58yo gentleman with h/o TBI, recurrent UTIs, CAD, HTN, and DM2 who was admitted with substernal chest pain    He has ruled out for ACS with neg Trop x 4, CTA chest was negative for PE  He had normal nuclear stress test in September of last year  Card felt he may have pericarditis given changes on EKG, Echo is unremarkable, inflammatory markers are high  Appreciate Card attention to pt, their notes now recommend adding Zithromax to Rocephin to treat potential community-acquired PNA . . . Dr. Tirado believes pt has a pleuritis due to mild parapneumonic effusions that aren't seen on CT, pt is now s/p 3 doses of Zithromax and on his last day of Rocephin, weaned off O2 now  DDimer was elevated, venous u/s of BLEs is negative for DVT  Continue Rocephin for possible UTI and Lotrisone for balanitis, urine culture not helpful  Appreciate  attention to pt  MCV elevated, B12 normal and folate is low, replacing  DC'd IVFs  Replacing K+ orally  Sugars are acceptable, HgbA1c is good at 6.00  Start bowel regimen, checked KUB and pt has significant stool burden, have tried daily Miralax/Senokot-S, Dulcolax supp and Fleet yesterday, no success, will give bottle of Mag Citrate today, if no success then tapwater enema  DC'd morphine, Lortab low-dose started PRN, restarted pt's home Gabapentin  For rash on pt's face with rx hydrocortisone cream as he uses at home    Dispo: home tomorrow if moving bowels. Should not need any more abx after  discharge  ).       Darvin Rosales MD  08/17/19  12:19 PM    Time: 30min

## 2019-08-18 VITALS
RESPIRATION RATE: 18 BRPM | BODY MASS INDEX: 33.99 KG/M2 | HEART RATE: 86 BPM | HEIGHT: 75 IN | SYSTOLIC BLOOD PRESSURE: 153 MMHG | TEMPERATURE: 97.6 F | OXYGEN SATURATION: 96 % | DIASTOLIC BLOOD PRESSURE: 82 MMHG | WEIGHT: 273.4 LBS

## 2019-08-18 LAB
ANION GAP SERPL CALCULATED.3IONS-SCNC: 15.4 MMOL/L (ref 5–15)
BASOPHILS # BLD AUTO: 0.05 10*3/MM3 (ref 0–0.2)
BASOPHILS NFR BLD AUTO: 0.4 % (ref 0–1.5)
BUN BLD-MCNC: 14 MG/DL (ref 6–20)
BUN/CREAT SERPL: 20 (ref 7–25)
CALCIUM SPEC-SCNC: 10 MG/DL (ref 8.6–10.5)
CHLORIDE SERPL-SCNC: 97 MMOL/L (ref 98–107)
CO2 SERPL-SCNC: 19.6 MMOL/L (ref 22–29)
CREAT BLD-MCNC: 0.7 MG/DL (ref 0.76–1.27)
DEPRECATED RDW RBC AUTO: 49.1 FL (ref 37–54)
EOSINOPHIL # BLD AUTO: 0.29 10*3/MM3 (ref 0–0.4)
EOSINOPHIL NFR BLD AUTO: 2.3 % (ref 0.3–6.2)
ERYTHROCYTE [DISTWIDTH] IN BLOOD BY AUTOMATED COUNT: 12.9 % (ref 12.3–15.4)
GFR SERPL CREATININE-BSD FRML MDRD: 140 ML/MIN/1.73
GLUCOSE BLD-MCNC: 161 MG/DL (ref 65–99)
GLUCOSE BLDC GLUCOMTR-MCNC: 151 MG/DL (ref 70–130)
GLUCOSE BLDC GLUCOMTR-MCNC: 199 MG/DL (ref 70–130)
HCT VFR BLD AUTO: 44.2 % (ref 37.5–51)
HGB BLD-MCNC: 14.7 G/DL (ref 13–17.7)
IMM GRANULOCYTES # BLD AUTO: 0.08 10*3/MM3 (ref 0–0.05)
IMM GRANULOCYTES NFR BLD AUTO: 0.6 % (ref 0–0.5)
LYMPHOCYTES # BLD AUTO: 2.85 10*3/MM3 (ref 0.7–3.1)
LYMPHOCYTES NFR BLD AUTO: 22.7 % (ref 19.6–45.3)
MCH RBC QN AUTO: 33.9 PG (ref 26.6–33)
MCHC RBC AUTO-ENTMCNC: 33.3 G/DL (ref 31.5–35.7)
MCV RBC AUTO: 102.1 FL (ref 79–97)
MONOCYTES # BLD AUTO: 1.56 10*3/MM3 (ref 0.1–0.9)
MONOCYTES NFR BLD AUTO: 12.4 % (ref 5–12)
NEUTROPHILS # BLD AUTO: 7.75 10*3/MM3 (ref 1.7–7)
NEUTROPHILS NFR BLD AUTO: 61.6 % (ref 42.7–76)
NRBC BLD AUTO-RTO: 0 /100 WBC (ref 0–0.2)
PLATELET # BLD AUTO: 445 10*3/MM3 (ref 140–450)
PMV BLD AUTO: 9 FL (ref 6–12)
POTASSIUM BLD-SCNC: 4 MMOL/L (ref 3.5–5.2)
RBC # BLD AUTO: 4.33 10*6/MM3 (ref 4.14–5.8)
SODIUM BLD-SCNC: 132 MMOL/L (ref 136–145)
WBC NRBC COR # BLD: 12.58 10*3/MM3 (ref 3.4–10.8)

## 2019-08-18 PROCEDURE — 82962 GLUCOSE BLOOD TEST: CPT

## 2019-08-18 PROCEDURE — 94799 UNLISTED PULMONARY SVC/PX: CPT

## 2019-08-18 PROCEDURE — 63710000001 INSULIN LISPRO (HUMAN) PER 5 UNITS: Performed by: INTERNAL MEDICINE

## 2019-08-18 PROCEDURE — 80048 BASIC METABOLIC PNL TOTAL CA: CPT | Performed by: HOSPITALIST

## 2019-08-18 PROCEDURE — 85025 COMPLETE CBC W/AUTO DIFF WBC: CPT | Performed by: HOSPITALIST

## 2019-08-18 RX ORDER — FOLIC ACID 1 MG/1
1 TABLET ORAL DAILY
Qty: 30 TABLET | Refills: 0 | Status: SHIPPED | OUTPATIENT
Start: 2019-08-19

## 2019-08-18 RX ORDER — ASPIRIN 81 MG/1
81 TABLET ORAL DAILY
Qty: 30 TABLET | Refills: 0 | Status: SHIPPED | OUTPATIENT
Start: 2019-08-19

## 2019-08-18 RX ORDER — SENNA AND DOCUSATE SODIUM 50; 8.6 MG/1; MG/1
2 TABLET, FILM COATED ORAL NIGHTLY
Qty: 60 TABLET | Refills: 0 | Status: SHIPPED | OUTPATIENT
Start: 2019-08-18

## 2019-08-18 RX ADMIN — FOLIC ACID 1 MG: 1 TABLET ORAL at 08:04

## 2019-08-18 RX ADMIN — INSULIN LISPRO 2 UNITS: 100 INJECTION, SOLUTION INTRAVENOUS; SUBCUTANEOUS at 08:04

## 2019-08-18 RX ADMIN — GABAPENTIN 300 MG: 300 CAPSULE ORAL at 05:57

## 2019-08-18 RX ADMIN — BUDESONIDE AND FORMOTEROL FUMARATE DIHYDRATE 2 PUFF: 80; 4.5 AEROSOL RESPIRATORY (INHALATION) at 07:50

## 2019-08-18 RX ADMIN — ASPIRIN 81 MG: 81 TABLET, COATED ORAL at 08:04

## 2019-08-18 RX ADMIN — INSULIN LISPRO 2 UNITS: 100 INJECTION, SOLUTION INTRAVENOUS; SUBCUTANEOUS at 12:03

## 2019-08-18 RX ADMIN — SODIUM CHLORIDE, PRESERVATIVE FREE 3 ML: 5 INJECTION INTRAVENOUS at 08:05

## 2019-08-18 RX ADMIN — HYDROCORTISONE 1 APPLICATION: 1 CREAM TOPICAL at 08:04

## 2019-08-18 RX ADMIN — CLOTRIMAZOLE AND BETAMETHASONE DIPROPIONATE: 10; .5 CREAM TOPICAL at 08:04

## 2019-08-18 NOTE — DISCHARGE SUMMARY
Patient Name: Kd Abraham  : 1960  MRN: [id number]    Date of Admission: 2019  Date of Discharge:  2019  Primary Care Physician: Heather Peraza APRN      Chief Complaint:   Chest Pain      Discharge Diagnoses     Active Hospital Problems    Diagnosis  POA   • **Chest pain [R07.9]  Yes   • Facial dermatitis [L30.9]  No   • Constipation [K59.00]  No   • Folate deficiency [E53.8]  Yes   • Coronary artery disease [I25.10]  Yes   • Hyperlipidemia [E78.5]  Yes   • Balanitis [N48.1]  Yes   • Elevated MCV [R71.8]  Yes   • Hypokalemia [E87.6]  No   • Elevated d-dimer [R79.89]  Yes   • DM2 (diabetes mellitus, type 2) (CMS/HCC) [E11.9]  Yes   • TBI (traumatic brain injury) (CMS/[facility]) [S06.9X9A]  Yes   • Hypertension [I10]  Yes   • UTI (urinary tract infection) [N39.0]  Yes      Resolved Hospital Problems   No resolved problems to display.        Hospital Course     Mr. Abraham is a 59 y.o. male former smoker with a history of CAD, DM2, traumatic brain injury, and HTN, who presented to  initially complaining of chest pain.  Please see the admitting history and physical for further details.  He was found to have atypical chest pain as well as a UTI and balanitis and was admitted to the hospital for further evaluation and treatment.  He was seen by GENEVA and Jael while here. See below for details:     He ruled out for ACS with neg Trop x 4, CTA chest was negative for PE  He had normal nuclear stress test in September of last year  Card felt he may have pericarditis given changes on EKG, Echo was unremarkable, inflammatory markers were high  Appreciate Card attention to pt, their notes recommended adding Zithromax to Rocephin to treat potential community-acquired PNA . . . Dr. Tirado believed pt had a pleuritis due to mild parapneumonic effusions that weren't seen on CT, pt is now s/p 3 doses of Zithromax and on his last day of Rocephin, weaned off O2 now  DDimer was elevated,  venous u/s of BLEs was negative for DVT  S/p Rocephin for possible UTI and Lotrisone for balanitis, urine culture not helpful  Appreciate  attention to pt, has f/u appt with [place]  MCV elevated, B12 normal and folate was low, replacing orally  Sugars are acceptable, HgbA1c is good at 6.00  Started bowel regimen but it was not effective, checked KUB and pt had significant stool burden, tried daily Miralax/Senokot-S, Dulcolax supp and Fleet yesterday, no success, with bottle of Mag Citrate pt was able to have large BM      Day of Discharge     Feeling okay today. No chest pain. Voiding well. Moving bowels since last PM.    Physical Exam:  Temp:  [97.6 °F (36.4 °C)-98 °F (36.7 °C)] 98 °F (36.7 °C)  Heart Rate:  [77-86] 86  Resp:  [16-18] 18  BP: (133-152)/(72-96) 133/81  Body mass index is 34.17 kg/m².  Physical Exam  General Appearance:  Comfortable and in no acute distress (walking in arriaga without need of assistance).     Vital signs are normal.  No fever.    Output: Producing urine (balanitis noted) and no stool output.    HEENT: Normal HEENT exam.    Lungs:  Normal effort and normal respiratory rate.  Breath sounds clear to auscultation.    Heart: Normal rate.  Regular rhythm.    Chest: Symmetric chest wall expansion. No chest wall tenderness.    Abdomen: Abdomen is soft.  Bowel sounds are normal.   There is no abdominal tenderness.     Extremities: There is no dependent edema.  (SCDs in place)  Pulses: Distal pulses are intact.    Neurological: Patient is alert and oriented to person, place and time.    Skin:  Warm and dry.  No rash.     Consultants     Consult Orders (all) (From admission, onward)    Start     Ordered    08/11/19 2252  Inpatient Urology Consult  Once     Specialty:  Urology  Provider:  Kang Boyle MD    08/11/19 2253 08/11/19 2211  Inpatient Cardiology Consult  Once     Specialty:  Cardiology  Provider:  Lane Soliman MD    08/11/19 2210 08/11/19 2022  [facility] (on-call MD  unless specified) Details  Once     Specialty:  Hospitalist  Provider:  Dominik Grossman MD    08/11/19 2021 08/11/19 1743  [facility] (on-call MD unless specified) Details  Once,   Status:  Canceled     Specialty:  Hospitalist  Provider:  (Not yet assigned)    08/11/19 1742        Procedures     * Surgery not found *    Imaging Results (all)     Procedure Component Value Units Date/Time    XR Abdomen KUB [[id number]] Collected:  08/15/19 1902     Updated:  08/15/19 1906    Narrative:       Abdominal radiographs     HISTORY:Constipation     TECHNIQUE:  2 AP supine radiographs of the abdomen     COMPARISON:CT abdomen and pelvis 08/11/2019       Impression:       FINDINGS AND IMPRESSION:  The visualized bowel gas pattern is nonobstructive. The right lateral  aspect of the abdomen is collimated outside the field-of-view.     A moderate to large amount stool is present in the colon.     Small right pleural effusion is suspected.     This report was finalized on 8/15/2019 7:03 PM by Dr. Alfredo Feldman M.D.       CT Angiogram Chest With Contrast [[id number]] Collected:  08/11/19 1911     Updated:  08/11/19 1926    Narrative:       CT ANGIOGRAM THORAX WITH CONTRAST, PULMONARY EMBOLISM PROTOCOL     HISTORY: Pulmonary embolism.     COMPARISON: None.     TECHNIQUE: Radiation dose reduction techniques were utilized, including  automated exposure control and exposure modulation based on body size.  Axial contrast-enhanced images of the chest were obtained according to  the pulmonary embolism protocol. Coronal oblique 3-D MIP reformatted  images were supplemented and reviewed.  100 mls of non ionic contrast  was utilized intravenously.     FINDINGS CHEST CT: Minimal bullous and fibrous changes are noted in the  lung apices, right greater than left. Dependent densities in the lower  lobes, right greater than left favored to be related to atelectasis  rather than pneumonia. There is no convincing evidence of active air  space disease  process otherwise. IV contrast bolus is less than optimal  for pulmonary embolism assessment. There is no central or large proximal  embolus. There are no secondary signs of PE in the parenchyma. Aorta  mildly ectatic but nonaneurysmal. Mild cardiomegaly.             Impression:       1. Dependent lower lobe opacities are favored to be related to areas of  atelectasis, no active airspace disease.  2. Suboptimal IV contrast bolus but no central or large proximal PE  demonstrated.                    CT SCANS ABDOMEN AND PELVIS WITH IV CONTRAST.     HISTORY: eval for PE     COMPARISON: None.     TECHNIQUE: Radiation dose reduction techniques were utilized, including  automated exposure control and exposure modulation based on body size.  Axial images were obtained from the lung bases to the symphysis pubis  with IV contrast only.. Oral contrast was not administered per request.     FINDINGS :  There are a couple of subcentimeter renal lesions  bilaterally. The lesions are too small for accurate or detailed  assessment. Small cysts are favored, particularly if there is no history  of malignancy.  Suggest follow-up to ensure stability. Remaining solid  organs have an unremarkable appearance. Normal aorta and appendix. Mild  diverticulosis. Mild constipation. The GI tract not opacified for  assessment but non obstructive in appearance. There is a fat-containing  ventral hernia to the left of midline without associated inflammatory  change.           IMPRESSION :   1. Tiny renal lesions as discussed.  2. Constipation, diverticulosis, no obstruction or acute inflammation        This report was finalized on 8/11/2019 7:22 PM by Christian Hutson M.D.       CT Abdomen Pelvis With Contrast [[id number]] Collected:  08/11/19 1911     Updated:  08/11/19 1926    Narrative:       CT ANGIOGRAM THORAX WITH CONTRAST, PULMONARY EMBOLISM PROTOCOL     HISTORY: Pulmonary embolism.     COMPARISON: None.     TECHNIQUE: Radiation dose reduction  techniques were utilized, including  automated exposure control and exposure modulation based on body size.  Axial contrast-enhanced images of the chest were obtained according to  the pulmonary embolism protocol. Coronal oblique 3-D MIP reformatted  images were supplemented and reviewed.  100 mls of non ionic contrast  was utilized intravenously.     FINDINGS CHEST CT: Minimal bullous and fibrous changes are noted in the  lung apices, right greater than left. Dependent densities in the lower  lobes, right greater than left favored to be related to atelectasis  rather than pneumonia. There is no convincing evidence of active air  space disease process otherwise. IV contrast bolus is less than optimal  for pulmonary embolism assessment. There is no central or large proximal  embolus. There are no secondary signs of PE in the parenchyma. Aorta  mildly ectatic but nonaneurysmal. Mild cardiomegaly.             Impression:       1. Dependent lower lobe opacities are favored to be related to areas of  atelectasis, no active airspace disease.  2. Suboptimal IV contrast bolus but no central or large proximal PE  demonstrated.                    CT SCANS ABDOMEN AND PELVIS WITH IV CONTRAST.     HISTORY: eval for PE     COMPARISON: None.     TECHNIQUE: Radiation dose reduction techniques were utilized, including  automated exposure control and exposure modulation based on body size.  Axial images were obtained from the lung bases to the symphysis pubis  with IV contrast only.. Oral contrast was not administered per request.     FINDINGS :  There are a couple of subcentimeter renal lesions  bilaterally. The lesions are too small for accurate or detailed  assessment. Small cysts are favored, particularly if there is no history  of malignancy.  Suggest follow-up to ensure stability. Remaining solid  organs have an unremarkable appearance. Normal aorta and appendix. Mild  diverticulosis. Mild constipation. The GI tract not  opacified for  assessment but non obstructive in appearance. There is a fat-containing  ventral hernia to the left of midline without associated inflammatory  change.           IMPRESSION :   1. Tiny renal lesions as discussed.  2. Constipation, diverticulosis, no obstruction or acute inflammation        This report was finalized on 8/11/2019 7:22 PM by Christian Hutson M.D.       XR Chest 2 View [[id number]] Collected:  08/11/19 1804     Updated:  08/11/19 1809    Narrative:       PA AND LATERAL CHEST     CLINICAL HISTORY: Chest pain     The lungs are fairly well-expanded and appear free of focal infiltrates  or masses. There is no pneumothorax. Tiny bilateral pleural effusions  are evident. The heart is within normal limits in size. The pulmonary  vasculature is within normal limits.     IMPRESSIONS: Tiny bilateral pleural effusions. Otherwise unremarkable  chest x-ray.     This report was finalized on 8/11/2019 6:06 PM by Dr. Iván Tang M.D.             Results for orders placed during the hospital encounter of 08/11/19   Duplex Venous Lower Extremity - Bilateral CAR    Narrative · Normal bilateral lower extremity venous duplex scan.          Results for orders placed during the hospital encounter of 08/11/19   Adult Transthoracic Echo Complete W/ Cont if Necessary Per Protocol    Narrative · Left ventricular systolic function is normal. Calculated EF = 56.6%.  · Left ventricular wall thickness is consistent with borderline concentric   hypertrophy.  · Mild aortic valve regurgitation is present.  · Mild mitral valve regurgitation is present  · Mild tricuspid valve regurgitation is present.  · Mild pulmonic valve regurgitation is present.  · Mild dilation of the aortic root is present.        Pertinent Labs     Results from last 7 days   Lab Units 08/18/19  0454 08/17/19  0458 08/16/19  0354 08/14/19  0410   WBC 10*3/mm3 12.58* 12.92* 11.29* 10.48   HEMOGLOBIN g/dL 14.7 14.6 13.6 13.4   PLATELETS 10*3/mm3 445  438 383 335     Results from last 7 days   Lab Units 08/18/19  0454 08/17/19  0458 08/16/19  0353 08/14/19  0410   SODIUM mmol/L 132* 136 135* 136   POTASSIUM mmol/L 4.0 4.0 3.6 3.5   CHLORIDE mmol/L 97* 99 99 100   CO2 mmol/L 19.6* 22.0 24.2 22.1   BUN mg/dL 14 11 12 8   CREATININE mg/dL 0.70* 0.66* 0.71* 0.57*   GLUCOSE mg/dL 161* 201* 227* 204*   Estimated Creatinine Clearance: 162.7 mL/min (A) (by C-G formula based on SCr of 0.7 mg/dL (L)).  Results from last 7 days   Lab Units 08/13/19  0514 08/12/19  0421 08/11/19  1730   ALBUMIN g/dL 3.50 3.80 4.70   BILIRUBIN mg/dL 0.4 0.5 0.5   ALK PHOS U/L 58 61 76   AST (SGOT) U/L 11 10 12   ALT (SGPT) U/L 6 6 6     Results from last 7 days   Lab Units 08/18/19  0454 08/17/19  0458 08/16/19  0353 08/14/19  0410 08/13/19  0514 08/12/19  0421 08/11/19  1730   CALCIUM mg/dL 10.0 10.3 9.6 8.6 9.0 8.6 9.5   ALBUMIN g/dL  --   --   --   --  3.50 3.80 4.70   MAGNESIUM mg/dL  --   --   --   --  1.9 1.7 1.7     Results from last 7 days   Lab Units 08/11/19  1730   LIPASE U/L 13     Results from last 7 days   Lab Units 08/12/19  1023 08/12/19  0421 08/11/19  2341 08/11/19  1730   TROPONIN T ng/mL <0.010 <0.010 <0.010 <0.010   D DIMER QUANT MCGFEU/mL  --   --   --  0.63*           Invalid input(s): LDLCALC  Results from last 7 days   Lab Units 08/11/19  1803   URINECX  >100,000 CFU/mL Mixed Joana Isolated       Test Results Pending at Discharge   None    Discharge Details        Discharge Medications      New Medications      Instructions Start Date   aspirin 81 MG EC tablet   81 mg, Oral, Daily   Start Date:  8/19/2019     folic acid 1 MG tablet  Commonly known as:  FOLVITE   1 mg, Oral, Daily   Start Date:  8/19/2019     polyethylene glycol pack packet  Commonly known as:  MIRALAX   17 g, Oral, Daily   Start Date:  8/19/2019     sennosides-docusate sodium 8.6-50 MG tablet  Commonly known as:  SENOKOT-S   2 tablets, Oral, Nightly         Continue These Medications      Instructions  Start Date   ALPRAZolam 0.5 MG tablet  Commonly known as:  XANAX   0.5 mg, Oral, 2 Times Daily PRN      cetirizine 10 MG tablet  Commonly known as:  zyrTEC   10 mg, Oral, Daily PRN      esomeprazole 40 MG capsule  Commonly known as:  nexIUM   40 mg, Oral, Every Morning Before Breakfast      fluticasone-salmeterol 230-21 MCG/ACT inhaler  Commonly known as:  ADVAIR HFA   2 puffs, Inhalation, Daily      gabapentin 300 MG capsule  Commonly known as:  NEURONTIN   300 mg, Oral, 3 Times Daily      sertraline 100 MG tablet  Commonly known as:  ZOLOFT   100 mg, Oral, Daily      topiramate 50 MG tablet  Commonly known as:  TOPAMAX   50 mg, Oral, Daily         Stop These Medications    amLODIPine-benazepril 10-20 MG per capsule  Commonly known as:  LOTREL     budesonide-formoterol 80-4.5 MCG/ACT inhaler  Commonly known as:  SYMBICORT            No Known Allergies      Discharge Disposition:  Home or Self Care    Discharge Diet:  Diet Order   Procedures   • Diet Regular; Cardiac       Discharge Activity:   as tolerated    CODE STATUS:    Code Status and Medical Interventions:   Ordered at: 08/11/19 2051     Code Status:    CPR     Medical Interventions (Level of Support Prior to Arrest):    Full       No future appointments.  Additional Instructions for the Follow-ups that You Need to Schedule     Discharge Follow-up with PCP   As directed       Currently Documented PCP:    Heather Peraza APRN    PCP Phone Number:    [phone]     Follow Up Details:  Stu NP (PCP) in 3-5 days         Discharge Follow-up with Specified Provider: Dr. Rome (GABRIELLA); 2 Weeks   As directed      To:  Dr. Rome (GABRIELLA)    Follow Up:  2 Weeks         Discharge Follow-up with Specified Provider: Dr. Boyle (GENEVA)   As directed      To:  Dr. Boyle (GENEVA)    Follow Up Details:  has appt scheduled           Follow-up Information     Heather Peraza APRN .    Specialty:  Family Medicine  Why:  Stu NP (PCP) in 3-5 days  Contact  information:  [address] [facility] [address]  [phone]                   Additional Instructions for the Follow-ups that You Need to Schedule     Discharge Follow-up with PCP   As directed       Currently Documented PCP:    Heather Peraza APRN    PCP Phone Number:    [phone]     Follow Up Details:  Stu PAIGE (PCP) in 3-5 days         Discharge Follow-up with Specified Provider: Dr. Rome (GABRIELLA); 2 Weeks   As directed      To:  Dr. Rome (GABRIELLA)    Follow Up:  2 Weeks         Discharge Follow-up with Specified Provider: Dr. Boyle (GENEVA)   As directed      To:  Dr. Boyle (GENEVA)    Follow Up Details:  has appt scheduled           Time Spent on Discharge:  Greater than 30 minutes      Darvin Rosales MD  [facility]  08/18/19  12:53 PM

## 2019-08-18 NOTE — PLAN OF CARE
Problem: Patient Care Overview  Goal: Plan of Care Review  Outcome: Ongoing (interventions implemented as appropriate)   08/18/19 6388   Coping/Psychosocial   Plan of Care Reviewed With patient   OTHER   Outcome Summary patient is being discharged      Goal: Individualization and Mutuality  Outcome: Ongoing (interventions implemented as appropriate)    Goal: Discharge Needs Assessment  Outcome: Ongoing (interventions implemented as appropriate)    Goal: Interprofessional Rounds/Family Conf  Outcome: Ongoing (interventions implemented as appropriate)      Problem: Fall Risk (Adult)  Goal: Identify Related Risk Factors and Signs and Symptoms  Outcome: Ongoing (interventions implemented as appropriate)    Goal: Absence of Fall  Outcome: Ongoing (interventions implemented as appropriate)      Problem: Pain, Acute (Adult)  Goal: Identify Related Risk Factors and Signs and Symptoms  Outcome: Ongoing (interventions implemented as appropriate)    Goal: Acceptable Pain Control/Comfort Level  Outcome: Ongoing (interventions implemented as appropriate)      Problem: Skin Injury Risk (Adult)  Goal: Identify Related Risk Factors and Signs and Symptoms  Outcome: Ongoing (interventions implemented as appropriate)    Goal: Skin Health and Integrity  Outcome: Ongoing (interventions implemented as appropriate)

## 2019-08-19 ENCOUNTER — READMISSION MANAGEMENT (OUTPATIENT)
Dept: CALL CENTER | Facility: HOSPITAL | Age: 59
End: 2019-08-19

## 2019-08-19 NOTE — PAYOR COMM NOTE
"Tiffanie Abraham (59 y.o. Male)                     ATTENTION;   CLINICALS FOR  , PATIENT DISCHARGED 19                    FAX DAYS APPROVED TO  DEPT, [phone]         Date of Birth Social Security Number Address Home Phone MRN    1960  [address] [facility] [address]  [id number]    Protestant Marital Status          Unknown Unknown       Admission Date Admission Type Admitting Provider Attending Provider Department, Room/Bed    19 Emergency Dominik Grossman MD  [address], [address]/    Discharge Date Discharge Disposition Discharge Destination        2019 Home or Self Care              Attending Provider:  (none)   Allergies:  No Known Allergies    Isolation:  None   Infection:  None   Code Status:  Prior    Ht:  190.5 cm (75\")   Wt:  124 kg (273 lb 6.4 oz)    Admission Cmt:  None   Principal Problem:  Chest pain [R07.9]                 Active Insurance as of 2019     Primary Coverage     Payor Plan Insurance Group Employer/Plan Group    [employer] [facility]      Payor Plan Address Payor Plan Phone Number Payor Plan Fax Number Effective Dates    [address]   2019 - None Entered    [facility] [id number]       Subscriber Name Subscriber Birth Date Member ID       TIFFANIE ABRAHAM 1960 [id number]                 Emergency Contacts      (Rel.) Home Phone Work Phone Mobile Phone    ABI ABRAHAM (Son) -- -- [phone]    WILMAR DIANA (Other) -- -- [phone]                Discharge Summary      Wilmar Rosales MD at 2019 12:53 PM              Patient Name: Tiffanie Abraham  : 1960  MRN: [id number]    Date of Admission: 2019  Date of Discharge:  2019  Primary Care Physician: Heather Peraza APRN      Chief Complaint:   Chest Pain      Discharge Diagnoses     Active Hospital Problems    Diagnosis  POA   • **Chest pain [R07.9]  Yes   • Facial dermatitis [L30.9]  No   • Constipation [K59.00]  No   • Folate " deficiency [E53.8]  Yes   • Coronary artery disease [I25.10]  Yes   • Hyperlipidemia [E78.5]  Yes   • Balanitis [N48.1]  Yes   • Elevated MCV [R71.8]  Yes   • Hypokalemia [E87.6]  No   • Elevated d-dimer [R79.89]  Yes   • DM2 (diabetes mellitus, type 2) (CMS/HCC) [E11.9]  Yes   • TBI (traumatic brain injury) (CMS/[facility]) [S06.9X9A]  Yes   • Hypertension [I10]  Yes   • UTI (urinary tract infection) [N39.0]  Yes      Resolved Hospital Problems   No resolved problems to display.        Hospital Course     Mr. Abraham is a 59 y.o. male former smoker with a history of CAD, DM2, traumatic brain injury, and HTN, who presented to [facility] initially complaining of chest pain.  Please see the admitting history and physical for further details.  He was found to have atypical chest pain as well as a UTI and balanitis and was admitted to the hospital for further evaluation and treatment.  He was seen by GENEVA and Jael while here. See below for details:     He ruled out for ACS with neg Trop x 4, CTA chest was negative for PE  He had normal nuclear stress test in September of last year  Card felt he may have pericarditis given changes on EKG, Echo was unremarkable, inflammatory markers were high  Appreciate Card attention to pt, their notes recommended adding Zithromax to Rocephin to treat potential community-acquired PNA . . . Dr. Tirado believed pt had a pleuritis due to mild parapneumonic effusions that weren't seen on CT, pt is now s/p 3 doses of Zithromax and on his last day of Rocephin, weaned off O2 now  DDimer was elevated, venous u/s of BLEs was negative for DVT  S/p Rocephin for possible UTI and Lotrisone for balanitis, urine culture not helpful  Appreciate GENEVA attention to pt, has f/u appt with [place]  MCV elevated, B12 normal and folate was low, replacing orally  Sugars are acceptable, HgbA1c is good at 6.00  Started bowel regimen but it was not effective, checked KUB and pt had significant stool  burden, tried daily Miralax/Senokot-S, Dulcolax supp and Fleet yesterday, no success, with bottle of Mag Citrate pt was able to have large BM      Day of Discharge     Feeling okay today. No chest pain. Voiding well. Moving bowels since last PM.    Physical Exam:  Temp:  [97.6 °F (36.4 °C)-98 °F (36.7 °C)] 98 °F (36.7 °C)  Heart Rate:  [77-86] 86  Resp:  [16-18] 18  BP: (133-152)/(72-96) 133/81  Body mass index is 34.17 kg/m².  Physical Exam  General Appearance:  Comfortable and in no acute distress (walking in arriaga without need of assistance).     Vital signs are normal.  No fever.    Output: Producing urine (balanitis noted) and no stool output.    HEENT: Normal HEENT exam.    Lungs:  Normal effort and normal respiratory rate.  Breath sounds clear to auscultation.    Heart: Normal rate.  Regular rhythm.    Chest: Symmetric chest wall expansion. No chest wall tenderness.    Abdomen: Abdomen is soft.  Bowel sounds are normal.   There is no abdominal tenderness.     Extremities: There is no dependent edema.  (SCDs in place)  Pulses: Distal pulses are intact.    Neurological: Patient is alert and oriented to person, place and time.    Skin:  Warm and dry.  No rash.     Consultants     Consult Orders (all) (From admission, onward)    Start     Ordered    08/11/19 2252  Inpatient Urology Consult  Once     Specialty:  Urology  Provider:  Kang Boyle MD    08/11/19 2253 08/11/19 2211  Inpatient Cardiology Consult  Once     Specialty:  Cardiology  Provider:  Lane Soliman MD    08/11/19 2210 08/11/19 2022  [facility] (on-call MD unless specified) Details  Once     Specialty:  Hospitalist  Provider:  Dominik Grossman MD    08/11/19 2021 08/11/19 1743  [facility] (on-call MD unless specified) Details  Once,   Status:  Canceled     Specialty:  Hospitalist  Provider:  (Not yet assigned)    08/11/19 1742        Procedures     * Surgery not found *    Imaging Results (all)     Procedure Component Value Units Date/Time    XR  Abdomen KUB [[id number]] Collected:  08/15/19 1902     Updated:  08/15/19 1906    Narrative:       Abdominal radiographs     HISTORY:Constipation     TECHNIQUE:  2 AP supine radiographs of the abdomen     COMPARISON:CT abdomen and pelvis 08/11/2019       Impression:       FINDINGS AND IMPRESSION:  The visualized bowel gas pattern is nonobstructive. The right lateral  aspect of the abdomen is collimated outside the field-of-view.     A moderate to large amount stool is present in the colon.     Small right pleural effusion is suspected.     This report was finalized on 8/15/2019 7:03 PM by Dr. Alfredo Feldman M.D.       CT Angiogram Chest With Contrast [[id number]] Collected:  08/11/19 1911     Updated:  08/11/19 1926    Narrative:       CT ANGIOGRAM THORAX WITH CONTRAST, PULMONARY EMBOLISM PROTOCOL     HISTORY: Pulmonary embolism.     COMPARISON: None.     TECHNIQUE: Radiation dose reduction techniques were utilized, including  automated exposure control and exposure modulation based on body size.  Axial contrast-enhanced images of the chest were obtained according to  the pulmonary embolism protocol. Coronal oblique 3-D MIP reformatted  images were supplemented and reviewed.  100 mls of non ionic contrast  was utilized intravenously.     FINDINGS CHEST CT: Minimal bullous and fibrous changes are noted in the  lung apices, right greater than left. Dependent densities in the lower  lobes, right greater than left favored to be related to atelectasis  rather than pneumonia. There is no convincing evidence of active air  space disease process otherwise. IV contrast bolus is less than optimal  for pulmonary embolism assessment. There is no central or large proximal  embolus. There are no secondary signs of PE in the parenchyma. Aorta  mildly ectatic but nonaneurysmal. Mild cardiomegaly.             Impression:       1. Dependent lower lobe opacities are favored to be related to areas of  atelectasis, no active airspace  disease.  2. Suboptimal IV contrast bolus but no central or large proximal PE  demonstrated.                    CT SCANS ABDOMEN AND PELVIS WITH IV CONTRAST.     HISTORY: eval for PE     COMPARISON: None.     TECHNIQUE: Radiation dose reduction techniques were utilized, including  automated exposure control and exposure modulation based on body size.  Axial images were obtained from the lung bases to the symphysis pubis  with IV contrast only.. Oral contrast was not administered per request.     FINDINGS :  There are a couple of subcentimeter renal lesions  bilaterally. The lesions are too small for accurate or detailed  assessment. Small cysts are favored, particularly if there is no history  of malignancy.  Suggest follow-up to ensure stability. Remaining solid  organs have an unremarkable appearance. Normal aorta and appendix. Mild  diverticulosis. Mild constipation. The GI tract not opacified for  assessment but non obstructive in appearance. There is a fat-containing  ventral hernia to the left of midline without associated inflammatory  change.           IMPRESSION :   1. Tiny renal lesions as discussed.  2. Constipation, diverticulosis, no obstruction or acute inflammation        This report was finalized on 8/11/2019 7:22 PM by Christian Hutson M.D.       CT Abdomen Pelvis With Contrast [[id number]] Collected:  08/11/19 1911     Updated:  08/11/19 1926    Narrative:       CT ANGIOGRAM THORAX WITH CONTRAST, PULMONARY EMBOLISM PROTOCOL     HISTORY: Pulmonary embolism.     COMPARISON: None.     TECHNIQUE: Radiation dose reduction techniques were utilized, including  automated exposure control and exposure modulation based on body size.  Axial contrast-enhanced images of the chest were obtained according to  the pulmonary embolism protocol. Coronal oblique 3-D MIP reformatted  images were supplemented and reviewed.  100 mls of non ionic contrast  was utilized intravenously.     FINDINGS CHEST CT: Minimal bullous  and fibrous changes are noted in the  lung apices, right greater than left. Dependent densities in the lower  lobes, right greater than left favored to be related to atelectasis  rather than pneumonia. There is no convincing evidence of active air  space disease process otherwise. IV contrast bolus is less than optimal  for pulmonary embolism assessment. There is no central or large proximal  embolus. There are no secondary signs of PE in the parenchyma. Aorta  mildly ectatic but nonaneurysmal. Mild cardiomegaly.             Impression:       1. Dependent lower lobe opacities are favored to be related to areas of  atelectasis, no active airspace disease.  2. Suboptimal IV contrast bolus but no central or large proximal PE  demonstrated.                    CT SCANS ABDOMEN AND PELVIS WITH IV CONTRAST.     HISTORY: eval for PE     COMPARISON: None.     TECHNIQUE: Radiation dose reduction techniques were utilized, including  automated exposure control and exposure modulation based on body size.  Axial images were obtained from the lung bases to the symphysis pubis  with IV contrast only.. Oral contrast was not administered per request.     FINDINGS :  There are a couple of subcentimeter renal lesions  bilaterally. The lesions are too small for accurate or detailed  assessment. Small cysts are favored, particularly if there is no history  of malignancy.  Suggest follow-up to ensure stability. Remaining solid  organs have an unremarkable appearance. Normal aorta and appendix. Mild  diverticulosis. Mild constipation. The GI tract not opacified for  assessment but non obstructive in appearance. There is a fat-containing  ventral hernia to the left of midline without associated inflammatory  change.           IMPRESSION :   1. Tiny renal lesions as discussed.  2. Constipation, diverticulosis, no obstruction or acute inflammation        This report was finalized on 8/11/2019 7:22 PM by Christian Hutson M.D.       XR Chest 2  View [[id number]] Collected:  08/11/19 1804     Updated:  08/11/19 1809    Narrative:       PA AND LATERAL CHEST     CLINICAL HISTORY: Chest pain     The lungs are fairly well-expanded and appear free of focal infiltrates  or masses. There is no pneumothorax. Tiny bilateral pleural effusions  are evident. The heart is within normal limits in size. The pulmonary  vasculature is within normal limits.     IMPRESSIONS: Tiny bilateral pleural effusions. Otherwise unremarkable  chest x-ray.     This report was finalized on 8/11/2019 6:06 PM by Dr. Iván Tang M.D.             Results for orders placed during the hospital encounter of 08/11/19   Duplex Venous Lower Extremity - Bilateral CAR    Narrative · Normal bilateral lower extremity venous duplex scan.          Results for orders placed during the hospital encounter of 08/11/19   Adult Transthoracic Echo Complete W/ Cont if Necessary Per Protocol    Narrative · Left ventricular systolic function is normal. Calculated EF = 56.6%.  · Left ventricular wall thickness is consistent with borderline concentric   hypertrophy.  · Mild aortic valve regurgitation is present.  · Mild mitral valve regurgitation is present  · Mild tricuspid valve regurgitation is present.  · Mild pulmonic valve regurgitation is present.  · Mild dilation of the aortic root is present.        Pertinent Labs     Results from last 7 days   Lab Units 08/18/19  0454 08/17/19  0458 08/16/19  0354 08/14/19  0410   WBC 10*3/mm3 12.58* 12.92* 11.29* 10.48   HEMOGLOBIN g/dL 14.7 14.6 13.6 13.4   PLATELETS 10*3/mm3 445 438 383 335     Results from last 7 days   Lab Units 08/18/19  0454 08/17/19  0458 08/16/19  0353 08/14/19  0410   SODIUM mmol/L 132* 136 135* 136   POTASSIUM mmol/L 4.0 4.0 3.6 3.5   CHLORIDE mmol/L 97* 99 99 100   CO2 mmol/L 19.6* 22.0 24.2 22.1   BUN mg/dL 14 11 12 8   CREATININE mg/dL 0.70* 0.66* 0.71* 0.57*   GLUCOSE mg/dL 161* 201* 227* 204*   Estimated Creatinine Clearance: 162.7  mL/min (A) (by C-G formula based on SCr of 0.7 mg/dL (L)).  Results from last 7 days   Lab Units 08/13/19  0514 08/12/19  0421 08/11/19  1730   ALBUMIN g/dL 3.50 3.80 4.70   BILIRUBIN mg/dL 0.4 0.5 0.5   ALK PHOS U/L 58 61 76   AST (SGOT) U/L 11 10 12   ALT (SGPT) U/L 6 6 6     Results from last 7 days   Lab Units 08/18/19  0454 08/17/19  0458 08/16/19  0353 08/14/19  0410 08/13/19  0514 08/12/19  0421 08/11/19  1730   CALCIUM mg/dL 10.0 10.3 9.6 8.6 9.0 8.6 9.5   ALBUMIN g/dL  --   --   --   --  3.50 3.80 4.70   MAGNESIUM mg/dL  --   --   --   --  1.9 1.7 1.7     Results from last 7 days   Lab Units 08/11/19  1730   LIPASE U/L 13     Results from last 7 days   Lab Units 08/12/19  1023 08/12/19  0421 08/11/19  2341 08/11/19  1730   TROPONIN T ng/mL <0.010 <0.010 <0.010 <0.010   D DIMER QUANT MCGFEU/mL  --   --   --  0.63*           Invalid input(s): LDLCALC  Results from last 7 days   Lab Units 08/11/19  1803   URINECX  >100,000 CFU/mL Mixed Joana Isolated       Test Results Pending at Discharge   None    Discharge Details        Discharge Medications      New Medications      Instructions Start Date   aspirin 81 MG EC tablet   81 mg, Oral, Daily   Start Date:  8/19/2019     folic acid 1 MG tablet  Commonly known as:  FOLVITE   1 mg, Oral, Daily   Start Date:  8/19/2019     polyethylene glycol pack packet  Commonly known as:  MIRALAX   17 g, Oral, Daily   Start Date:  8/19/2019     sennosides-docusate sodium 8.6-50 MG tablet  Commonly known as:  SENOKOT-S   2 tablets, Oral, Nightly         Continue These Medications      Instructions Start Date   ALPRAZolam 0.5 MG tablet  Commonly known as:  XANAX   0.5 mg, Oral, 2 Times Daily PRN      cetirizine 10 MG tablet  Commonly known as:  zyrTEC   10 mg, Oral, Daily PRN      esomeprazole 40 MG capsule  Commonly known as:  nexIUM   40 mg, Oral, Every Morning Before Breakfast      fluticasone-salmeterol 230-21 MCG/ACT inhaler  Commonly known as:  ADVAIR HFA   2 puffs,  Inhalation, Daily      gabapentin 300 MG capsule  Commonly known as:  NEURONTIN   300 mg, Oral, 3 Times Daily      sertraline 100 MG tablet  Commonly known as:  ZOLOFT   100 mg, Oral, Daily      topiramate 50 MG tablet  Commonly known as:  TOPAMAX   50 mg, Oral, Daily         Stop These Medications    amLODIPine-benazepril 10-20 MG per capsule  Commonly known as:  LOTREL     budesonide-formoterol 80-4.5 MCG/ACT inhaler  Commonly known as:  SYMBICORT            No Known Allergies      Discharge Disposition:  Home or Self Care    Discharge Diet:  Diet Order   Procedures   • Diet Regular; Cardiac       Discharge Activity:   as tolerated    CODE STATUS:    Code Status and Medical Interventions:   Ordered at: 08/11/19 2051     Code Status:    CPR     Medical Interventions (Level of Support Prior to Arrest):    Full       No future appointments.  Additional Instructions for the Follow-ups that You Need to Schedule     Discharge Follow-up with PCP   As directed       Currently Documented PCP:    Heather Peraza APRN    PCP Phone Number:    [phone]     Follow Up Details:  Stu NP (PCP) in 3-5 days         Discharge Follow-up with Specified Provider: Dr. Rome (GABRIELLA); 2 Weeks   As directed      To:  Dr. Rome (GABRIELLA)    Follow Up:  2 Weeks         Discharge Follow-up with Specified Provider: Dr. Boyle (GENEVA)   As directed      To:  Dr. Boyle (GENEVA)    Follow Up Details:  has appt scheduled           Follow-up Information     Heather Peraza APRN .    Specialty:  Family Medicine  Why:  Stu NP (PCP) in 3-5 days  Contact information:  [address] [address]  [phone]                   Additional Instructions for the Follow-ups that You Need to Schedule     Discharge Follow-up with PCP   As directed       Currently Documented PCP:    Heather Peraza APRN    PCP Phone Number:    [phone]     Follow Up Details:  Stu NP (PCP) in 3-5 days         Discharge  Follow-up with Specified Provider: Dr. Rome (GABRIELLA); 2 Weeks   As directed      To:  Dr. Rome (CARD)    Follow Up:  2 Weeks         Discharge Follow-up with Specified Provider: Dr. Boyle (GENEVA)   As directed      To:  Dr. Boyle ()    Follow Up Details:  has appt scheduled           Time Spent on Discharge:  Greater than 30 minutes      Darvin Rosales MD  [facility]  08/18/19  12:53 PM                Electronically signed by Darvin Rosales MD at 8/18/2019  1:00 PM               Darvin Rosales MD   Physician   Hospitalist   Progress Notes   Signed   Date of Service:  8/17/2019 12:19 PM   Creation Time:  8/17/2019 12:19 PM            Signed        Expand All Collapse All          Show:Clear all  [x]Manual[x]Template[x]Copied    Added by:  [x]Darvin Rosales MD      []Eliecer for details          LOS: 5 days   Patient Care Team:  Heather Peraza APRN as PCP - General (Family Medicine)     Chief Complaint: constipation        Subjective         Feeling okay today. Up walking around without SOA. Voiding well. Penis is much better. Still no significant BM. C/o rash on face today. Says he gets from time to time and his PCP has rx'd him topical steroid cream.            Subjective:  Symptoms:  Improved.  No shortness of breath, malaise, cough, chest pain, weakness, headache, chest pressure, anorexia, diarrhea or anxiety.    Diet:  Adequate intake.  No nausea or vomiting.    Activity level: Impaired due to pain.    Pain:  He reports no pain.          History taken from: patient chart RN          Objective         Vital Signs  Temp:  [98 °F (36.7 °C)-98.5 °F (36.9 °C)] 98.3 °F (36.8 °C)  Heart Rate:  [] 79  Resp:  [16-18] 18  BP: (139-151)/(87-93) 139/92     Objective:  General Appearance:  Comfortable and in no acute distress (walking in arriaga without need of assistance).    Vital signs: (most recent): Blood pressure 139/92, pulse 79, temperature 98.3 °F (36.8 °C), temperature  "source Oral, resp. rate 18, height 190.5 cm (75\"), weight 124 kg (273 lb 6.4 oz), SpO2 93 %.  Vital signs are normal.  No fever.    Output: Producing urine (balanitis noted) and no stool output.    HEENT: Normal HEENT exam.    Lungs:  Normal effort and normal respiratory rate.  Breath sounds clear to auscultation.    Heart: Normal rate.  Regular rhythm.    Chest: Symmetric chest wall expansion. No chest wall tenderness.    Abdomen: Abdomen is soft.  Bowel sounds are normal.   There is no abdominal tenderness.     Extremities: There is no dependent edema.  (SCDs in place)  Pulses: Distal pulses are intact.    Neurological: Patient is alert and oriented to person, place and time.    Skin:  Warm and dry.  There is a rash.  Cyanosis: erythema of cheeks, symmetric.                   Results Review:                I reviewed the patient's new clinical results.  I reviewed the patient's other test results and agree with the interpretation  Discussed with pt and RN     Medication Review: reviewed and adjusted          Assessment/Plan           Chest pain    TBI (traumatic brain injury) (CMS/HCC)    Hypertension    UTI (urinary tract infection)    Coronary artery disease    Hyperlipidemia    Balanitis    Elevated MCV    Hypokalemia    Elevated d-dimer    DM2 (diabetes mellitus, type 2) (CMS/[facility])    Folate deficiency    Constipation    Facial dermatitis              Plan:   (Pleasant 60yo gentleman with h/o TBI, recurrent UTIs, CAD, HTN, and DM2 who was admitted with substernal chest pain     He has ruled out for ACS with neg Trop x 4, CTA chest was negative for PE  He had normal nuclear stress test in September of last year  Card felt he may have pericarditis given changes on EKG, Echo is unremarkable, inflammatory markers are high  Appreciate Card attention to pt, their notes now recommend adding Zithromax to Rocephin to treat potential community-acquired PNA . . . Dr. Tirado believes pt has a pleuritis due to mild " parapneumonic effusions that aren't seen on CT, pt is now s/p 3 doses of Zithromax and on his last day of Rocephin, weaned off O2 now  DDimer was elevated, venous u/s of BLEs is negative for DVT  Continue Rocephin for possible UTI and Lotrisone for balanitis, urine culture not helpful  Appreciate  attention to pt  MCV elevated, B12 normal and folate is low, replacing  DC'd IVFs  Replacing K+ orally  Sugars are acceptable, HgbA1c is good at 6.00  Start bowel regimen, checked KUB and pt has significant stool burden, have tried daily Miralax/Senokot-S, Dulcolax supp and Fleet yesterday, no success, will give bottle of Mag Citrate today, if no success then tapwater enema  DC'd morphine, Lortab low-dose started PRN, restarted pt's home Gabapentin  For rash on pt's face with rx hydrocortisone cream as he uses at home     Dispo: home tomorrow if moving bowels. Should not need any more abx after discharge  ).         Darvin Rosales MD  08/17/19  12:19 PM     Time: 30min                               ED to Hosp-Admission (Discharged) on 8/11/2019            Detailed Report

## 2019-08-19 NOTE — PROGRESS NOTES
Case Management Discharge Note    Final Note: home    Destination      No service has been selected for the patient.      Durable Medical Equipment      No service has been selected for the patient.      Dialysis/Infusion      No service has been selected for the patient.      Home Medical Care      No service has been selected for the patient.      Therapy      No service has been selected for the patient.      Community Resources      No service has been selected for the patient.        Transportation Services  Private: (private pay uber)    Final Discharge Disposition Code: 01 - home or self-care

## 2019-08-20 ENCOUNTER — READMISSION MANAGEMENT (OUTPATIENT)
Dept: CALL CENTER | Facility: HOSPITAL | Age: 59
End: 2019-08-20

## 2019-08-20 NOTE — OUTREACH NOTE
Prep Survey      Responses   Facility patient discharged from?  [place]   Is patient eligible?  Yes   Discharge diagnosis  chest pain, UTI, T2DM   Does the patient have one of the following disease processes/diagnoses(primary or secondary)?  Other   Does the patient have Home health ordered?  No   Is there a DME ordered?  No   Prep survey completed?  Yes          Kassy Allen RN

## 2019-08-20 NOTE — OUTREACH NOTE
Medical Week 1 Survey      Responses   Facility patient discharged from?  [place]   Does the patient have one of the following disease processes/diagnoses(primary or secondary)?  Other   Is there a successful TCM telephone encounter documented?  No   Week 1 attempt successful?  No   Unsuccessful attempts  Attempt 1          Cecilia Bautista RN

## 2019-08-21 ENCOUNTER — READMISSION MANAGEMENT (OUTPATIENT)
Dept: CALL CENTER | Facility: HOSPITAL | Age: 59
End: 2019-08-21

## 2019-08-21 NOTE — OUTREACH NOTE
Medical Week 1 Survey      Responses   Facility patient discharged from?  [facility]   Does the patient have one of the following disease processes/diagnoses(primary or secondary)?  Other   Is there a successful TCM telephone encounter documented?  No   Week 1 attempt successful?  Yes   Call start time  1204   Call end time  1205   Discharge diagnosis  chest pain, UTI, T2DM   Is patient permission given to speak with other caregiver?  Yes   List who call center can speak with  son   Meds reviewed with patient/caregiver?  Yes   Is the patient having any side effects they believe may be caused by any medication additions or changes?  No   Does the patient have all medications ordered at discharge?  Yes   Is the patient taking all medications as directed (includes completed medication regime)?  Yes   Does the patient have a primary care provider?   Yes   Does the patient have an appointment with their PCP within 7 days of discharge?  Yes   Has the patient kept scheduled appointments due by today?  N/A   Psychosocial issues?  No   Comments  Pt feeling well.   Did the patient receive a copy of their discharge instructions?  Yes   Nursing interventions  Reviewed instructions with patient   What is the patient's perception of their health status since discharge?  Returned to baseline/stable   Is the patient/caregiver able to teach back signs and symptoms related to disease process for when to call PCP?  Yes   Is the patient/caregiver able to teach back signs and symptoms related to disease process for when to call 911?  Yes   Is the patient/caregiver able to teach back the hierarchy of who to call/visit for symptoms/problems? PCP, Specialist, Home health nurse, Urgent Care, ED, 911  Yes   Week 1 call completed?  Yes   Revoked  No further contact(revokes)-requires comment   Graduated/Revoked comments  baseline          Tran Joiner RN

## 2019-10-03 PROBLEM — I25.83 CORONARY ARTERY DISEASE DUE TO LIPID RICH PLAQUE: Status: ACTIVE | Noted: 2019-08-12

## 2019-10-03 PROBLEM — I25.10 CORONARY ARTERY DISEASE DUE TO LIPID RICH PLAQUE: Status: ACTIVE | Noted: 2019-08-12

## 2019-10-03 PROBLEM — E78.2 MIXED HYPERLIPIDEMIA: Status: ACTIVE | Noted: 2019-08-12

## 2019-10-03 PROBLEM — E11.9 TYPE 2 DIABETES MELLITUS WITHOUT COMPLICATION, WITHOUT LONG-TERM CURRENT USE OF INSULIN (HCC): Status: ACTIVE | Noted: 2019-08-12

## 2019-10-04 ENCOUNTER — OFFICE VISIT (OUTPATIENT)
Dept: CARDIOLOGY | Facility: CLINIC | Age: 59
End: 2019-10-04

## 2019-10-04 VITALS
WEIGHT: 277.6 LBS | BODY MASS INDEX: 37.6 KG/M2 | SYSTOLIC BLOOD PRESSURE: 138 MMHG | HEIGHT: 72 IN | HEART RATE: 85 BPM | OXYGEN SATURATION: 97 % | RESPIRATION RATE: 18 BRPM | DIASTOLIC BLOOD PRESSURE: 84 MMHG

## 2019-10-04 DIAGNOSIS — I25.83 CORONARY ARTERY DISEASE DUE TO LIPID RICH PLAQUE: Primary | ICD-10-CM

## 2019-10-04 DIAGNOSIS — E11.9 TYPE 2 DIABETES MELLITUS WITHOUT COMPLICATION, WITHOUT LONG-TERM CURRENT USE OF INSULIN (HCC): ICD-10-CM

## 2019-10-04 DIAGNOSIS — I25.10 CORONARY ARTERY DISEASE DUE TO LIPID RICH PLAQUE: Primary | ICD-10-CM

## 2019-10-04 DIAGNOSIS — E78.2 MIXED HYPERLIPIDEMIA: ICD-10-CM

## 2019-10-04 DIAGNOSIS — I10 ESSENTIAL HYPERTENSION: ICD-10-CM

## 2019-10-04 PROCEDURE — 99213 OFFICE O/P EST LOW 20 MIN: CPT | Performed by: INTERNAL MEDICINE

## 2019-10-04 RX ORDER — METFORMIN HYDROCHLORIDE 750 MG/1
750 TABLET, EXTENDED RELEASE ORAL DAILY
COMMUNITY
Start: 2019-08-22 | End: 2019-11-20

## 2019-10-04 NOTE — PROGRESS NOTES
Subjective:     Encounter Date:10/04/2019      Patient ID: Kd Abraham is a 59 y.o. male.    Chief Complaint:  Chief Complaint   Patient presents with   • Hypertension   • Hyperlipidemia   • Coronary Artery Disease   • Pneumonia   • Chest Pain       HPI:  59-year-old male patient who reportedly sees Dr. Kat at [facility].  He has multiple coronary artery risk factors including diabetes mellitus type II, dyslipidemia, hypertension and tobacco use.  He was admitted to [facility] for complaints of chest pain and shortness of breath.     We were consulted for his chest pain complaint and his reported cardiac history.  It turned out that he actually had underlying pneumonia and was treated accordingly.  I personally reviewed his ECG from this hospitalization which shows normal sinus rhythm with inferior T wave flattening otherwise nonspecific ST-T wave changes.  In addition I reviewed his echocardiogram images from 8/13/2018 which showed normal LV systolic function, normal RV systolic function, no significant valvular heart disease and no evidence of pericardial effusion.  So had no evidence of elevated right-sided filling pressures.  His cardiac troponins were negative.    Is undergone cardiac catheterization on 2 separate occasions both of which she was found to have nonobstructive coronary artery disease.  He returns today with no complaints from a cardiovascular standpoint.    The following portions of the patient's history were reviewed and updated as appropriate: allergies, current medications, past family history, past medical history, past social history, past surgical history and problem list.    Problem List:  Patient Active Problem List   Diagnosis   • Chest pain   • TBI (traumatic brain injury) (CMS/[facility])   • Essential hypertension   • UTI (urinary tract infection)   • Coronary artery disease due to lipid rich plaque   • Mixed hyperlipidemia   • Balanitis   • Elevated  MCV   • Hypokalemia   • Elevated d-dimer   • Type 2 diabetes mellitus without complication, without long-term current use of insulin (CMS/[facility])   • Folate deficiency   • Constipation   • Facial dermatitis       Past Medical History:  Past Medical History:   Diagnosis Date   • Arthritis    • Asthma    • Concussion    • Coronary artery disease    • Diabetes mellitus (CMS/[facility])    • Diverticulitis    • Elevated cholesterol    • GERD (gastroesophageal reflux disease)    • History of echocardiogram 2019    EF 56%, Mild AR/TR/MR/MO. Mild dilation of aortic root   • Hyperlipidemia    • Hypertension    • Kidney stone    • Migraine    • TBI (traumatic brain injury) (CMS/[facility])    • Torn meniscus        Past Surgical History:  Past Surgical History:   Procedure Laterality Date   • ABDOMINAL SURGERY     • CARDIAC CATHETERIZATION     • COLON RESECTION     • COLON SURGERY     • COLONOSCOPY     • ENDOSCOPY     • HERNIA REPAIR     • SKIN BIOPSY         Social History:  Social History     Socioeconomic History   • Marital status: Single     Spouse name: Not on file   • Number of children: Not on file   • Years of education: Not on file   • Highest education level: Not on file   Tobacco Use   • Smoking status: Former Smoker     Packs/day: 0.50     Years: 15.00     Pack years: 7.50     Types: Cigarettes     Start date:      Last attempt to quit:      Years since quittin.7   • Smokeless tobacco: Never Used   Substance and Sexual Activity   • Alcohol use: Yes     Alcohol/week: 0.6 - 1.8 oz     Types: 1 - 3 Glasses of wine per week     Comment: DRINKS 3-4X'S A WEEK   • Drug use: No   • Sexual activity: Defer     Birth control/protection: None       Allergies:  No Known Allergies      Review of Symptoms:  Constitutional: Patient afebrile no chills or unexpected weight changes  Respiratory: No cough, no wheezing or dyspnea  Cardiovascular: No chest pain, palpitations, dyspnea, orthopnea and no edema  Gastrointestinal: No  "nausea, vomiting, constipation or diarrhea.  No melena or dark stools    All other systems reviewed and are negative         Objective:         /84 (BP Location: Left arm, Patient Position: Sitting, Cuff Size: Large Adult)   Pulse 85   Resp 18   Ht 182.9 cm (72\")   Wt 126 kg (277 lb 9.6 oz)   SpO2 97%   BMI 37.65 kg/m²     Physical exam  Constitutional: well-nourished, and appears stated age in no acute distress  PERRL: Conjunctiva clear, no pallor, anicteric  HENMT: normocephalic, normal dentition, no cyanosis or pallor  Neck:no bruits, or thrills and bilateral normal carotid upstroke. Normal jugular venous pressure  Cardiovascular: No parasternal heaves an non-displaced focal PMI. Normal rate and rhythm: no rub, gallop, murmur or click and normal S1 and S2; no lower or upper extremity edema.   Lungs: unlabored, no wheezing with no rales or rhonchi on auscultation.  Extremities: Warm, no clubbing, cyanosis. Full and equal peripheral pulses in extremities with no bruits appreciated.   Abdomen: soft, non-tender, non-distended  Musculoskeletal: no joint tenderness or swelling and no erythema  Skin: Warm and dry, non-erythematous   Neuro:alert and normal affect. Oriented to time, place and person.       In-Office Procedure(s):  Procedures    ASCVD RIsk Score::  The ASCVD Risk score (Lansing DC Jr., et al., 2013) failed to calculate for the following reasons:    Cannot find a previous HDL lab    Cannot find a previous total cholesterol lab    Recent Radiology:  Imaging Results (most recent)     None          Lab Review:   Admission on 08/11/2019, Discharged on 08/18/2019   No results displayed because visit has over 200 results.                 Invalid input(s): ALKPO4                        Invalid input(s): LDLCALC                Assessment:          Diagnosis Plan   1. Coronary artery disease due to lipid rich plaque     2. Mixed hyperlipidemia     3. Essential hypertension     4. Type 2 diabetes mellitus " without complication, without long-term current use of insulin (CMS/[facility])            Plan:         1. Coronary artery disease due to lipid rich plaque  Reportedly nonobstructive and clinically silent.  Continue medical therapy as primary prevention for the development of ischemic heart disease which includes 40 mg of pravastatin p.o. daily and aspirin therapy.    2. Mixed hyperlipidemia  Controlled on medical therapy    3. Essential hypertension  Currently well controlled on antihypertensive therapy.    4. Type 2 diabetes mellitus without complication, without long-term current use of insulin (CMS/[facility])  Well-controlled and managed by PCP.      Level of Care:                 Uriel Tirado MD  10/04/19  .